# Patient Record
Sex: FEMALE | Race: BLACK OR AFRICAN AMERICAN | NOT HISPANIC OR LATINO | Employment: UNEMPLOYED | URBAN - METROPOLITAN AREA
[De-identification: names, ages, dates, MRNs, and addresses within clinical notes are randomized per-mention and may not be internally consistent; named-entity substitution may affect disease eponyms.]

---

## 2023-12-11 ENCOUNTER — HOSPITAL ENCOUNTER (OUTPATIENT)
Facility: HOSPITAL | Age: 88
Setting detail: OBSERVATION
Discharge: HOME | End: 2023-12-12
Attending: EMERGENCY MEDICINE
Payer: COMMERCIAL

## 2023-12-11 ENCOUNTER — APPOINTMENT (OUTPATIENT)
Dept: RADIOLOGY | Facility: HOSPITAL | Age: 88
End: 2023-12-11
Payer: COMMERCIAL
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
## 2023-12-11 DIAGNOSIS — G45.9 TIA (TRANSIENT ISCHEMIC ATTACK): ICD-10-CM

## 2023-12-11 DIAGNOSIS — R55 SYNCOPE AND COLLAPSE: ICD-10-CM

## 2023-12-11 LAB
ALBUMIN SERPL BCP-MCNC: 4.2 G/DL (ref 3.4–5)
ALP SERPL-CCNC: 116 U/L (ref 33–136)
ALT SERPL W P-5'-P-CCNC: 11 U/L (ref 7–45)
ANION GAP BLDV CALCULATED.4IONS-SCNC: 11 MMOL/L (ref 10–25)
ANION GAP SERPL CALC-SCNC: 19 MMOL/L (ref 10–20)
APTT PPP: 35 SECONDS (ref 27–38)
AST SERPL W P-5'-P-CCNC: 17 U/L (ref 9–39)
BASE EXCESS BLDV CALC-SCNC: 1.2 MMOL/L (ref -2–3)
BASOPHILS # BLD AUTO: 0.02 X10*3/UL (ref 0–0.1)
BASOPHILS NFR BLD AUTO: 0.2 %
BILIRUB SERPL-MCNC: 0.4 MG/DL (ref 0–1.2)
BODY TEMPERATURE: 37 DEGREES CELSIUS
BUN SERPL-MCNC: 23 MG/DL (ref 6–23)
CA-I BLDV-SCNC: 1.12 MMOL/L (ref 1.1–1.33)
CALCIUM SERPL-MCNC: 9.4 MG/DL (ref 8.6–10.6)
CARDIAC TROPONIN I PNL SERPL HS: 13 NG/L (ref 0–34)
CHLORIDE BLDV-SCNC: 104 MMOL/L (ref 98–107)
CHLORIDE SERPL-SCNC: 104 MMOL/L (ref 98–107)
CHOLEST SERPL-MCNC: 207 MG/DL (ref 0–199)
CHOLESTEROL/HDL RATIO: 4.2
CO2 SERPL-SCNC: 24 MMOL/L (ref 21–32)
CREAT SERPL-MCNC: 1.45 MG/DL (ref 0.5–1.05)
EOSINOPHIL # BLD AUTO: 0.03 X10*3/UL (ref 0–0.4)
EOSINOPHIL NFR BLD AUTO: 0.2 %
ERYTHROCYTE [DISTWIDTH] IN BLOOD BY AUTOMATED COUNT: 14.2 % (ref 11.5–14.5)
EST. AVERAGE GLUCOSE BLD GHB EST-MCNC: 103 MG/DL
GFR SERPL CREATININE-BSD FRML MDRD: 34 ML/MIN/1.73M*2
GLUCOSE BLD MANUAL STRIP-MCNC: 124 MG/DL (ref 74–99)
GLUCOSE BLDV-MCNC: 122 MG/DL (ref 74–99)
GLUCOSE SERPL-MCNC: 105 MG/DL (ref 74–99)
HBA1C MFR BLD: 5.2 %
HCO3 BLDV-SCNC: 26.6 MMOL/L (ref 22–26)
HCT VFR BLD AUTO: 45.3 % (ref 36–46)
HCT VFR BLD EST: 45 % (ref 36–46)
HDLC SERPL-MCNC: 48.9 MG/DL
HGB BLD-MCNC: 14.7 G/DL (ref 12–16)
HGB BLDV-MCNC: 15.1 G/DL (ref 12–16)
HOLD SPECIMEN: NORMAL
IMM GRANULOCYTES # BLD AUTO: 0.05 X10*3/UL (ref 0–0.5)
IMM GRANULOCYTES NFR BLD AUTO: 0.4 % (ref 0–0.9)
INR PPP: 1 (ref 0.9–1.1)
LACTATE BLDV-SCNC: 1.6 MMOL/L (ref 0.4–2)
LYMPHOCYTES # BLD AUTO: 1.22 X10*3/UL (ref 0.8–3)
LYMPHOCYTES NFR BLD AUTO: 9.7 %
MCH RBC QN AUTO: 27.3 PG (ref 26–34)
MCHC RBC AUTO-ENTMCNC: 32.5 G/DL (ref 32–36)
MCV RBC AUTO: 84 FL (ref 80–100)
MONOCYTES # BLD AUTO: 0.74 X10*3/UL (ref 0.05–0.8)
MONOCYTES NFR BLD AUTO: 5.9 %
NEUTROPHILS # BLD AUTO: 10.54 X10*3/UL (ref 1.6–5.5)
NEUTROPHILS NFR BLD AUTO: 83.6 %
NON-HDL CHOLESTEROL: 158 MG/DL (ref 0–149)
NRBC BLD-RTO: 0 /100 WBCS (ref 0–0)
OXYHGB MFR BLDV: 74.4 % (ref 45–75)
PCO2 BLDV: 44 MM HG (ref 41–51)
PH BLDV: 7.39 PH (ref 7.33–7.43)
PLATELET # BLD AUTO: 294 X10*3/UL (ref 150–450)
PO2 BLDV: 46 MM HG (ref 35–45)
POTASSIUM BLDV-SCNC: 5 MMOL/L (ref 3.5–5.3)
POTASSIUM SERPL-SCNC: 4.7 MMOL/L (ref 3.5–5.3)
PROT SERPL-MCNC: 6.9 G/DL (ref 6.4–8.2)
PROTHROMBIN TIME: 11.7 SECONDS (ref 9.8–12.8)
RBC # BLD AUTO: 5.39 X10*6/UL (ref 4–5.2)
SAO2 % BLDV: 76 % (ref 45–75)
SODIUM BLDV-SCNC: 137 MMOL/L (ref 136–145)
SODIUM SERPL-SCNC: 142 MMOL/L (ref 136–145)
WBC # BLD AUTO: 12.6 X10*3/UL (ref 4.4–11.3)

## 2023-12-11 PROCEDURE — 70450 CT HEAD/BRAIN W/O DYE: CPT

## 2023-12-11 PROCEDURE — 99291 CRITICAL CARE FIRST HOUR: CPT | Performed by: EMERGENCY MEDICINE

## 2023-12-11 PROCEDURE — 85025 COMPLETE CBC W/AUTO DIFF WBC: CPT | Performed by: STUDENT IN AN ORGANIZED HEALTH CARE EDUCATION/TRAINING PROGRAM

## 2023-12-11 PROCEDURE — 85730 THROMBOPLASTIN TIME PARTIAL: CPT | Performed by: STUDENT IN AN ORGANIZED HEALTH CARE EDUCATION/TRAINING PROGRAM

## 2023-12-11 PROCEDURE — 72125 CT NECK SPINE W/O DYE: CPT | Performed by: RADIOLOGY

## 2023-12-11 PROCEDURE — 82947 ASSAY GLUCOSE BLOOD QUANT: CPT | Mod: 59

## 2023-12-11 PROCEDURE — 83718 ASSAY OF LIPOPROTEIN: CPT

## 2023-12-11 PROCEDURE — G0378 HOSPITAL OBSERVATION PER HR: HCPCS

## 2023-12-11 PROCEDURE — 85610 PROTHROMBIN TIME: CPT | Performed by: STUDENT IN AN ORGANIZED HEALTH CARE EDUCATION/TRAINING PROGRAM

## 2023-12-11 PROCEDURE — 36415 COLL VENOUS BLD VENIPUNCTURE: CPT | Performed by: STUDENT IN AN ORGANIZED HEALTH CARE EDUCATION/TRAINING PROGRAM

## 2023-12-11 PROCEDURE — 71046 X-RAY EXAM CHEST 2 VIEWS: CPT | Performed by: RADIOLOGY

## 2023-12-11 PROCEDURE — 80053 COMPREHEN METABOLIC PANEL: CPT | Performed by: STUDENT IN AN ORGANIZED HEALTH CARE EDUCATION/TRAINING PROGRAM

## 2023-12-11 PROCEDURE — 70544 MR ANGIOGRAPHY HEAD W/O DYE: CPT | Mod: 59

## 2023-12-11 PROCEDURE — 70551 MRI BRAIN STEM W/O DYE: CPT

## 2023-12-11 PROCEDURE — 2500000004 HC RX 250 GENERAL PHARMACY W/ HCPCS (ALT 636 FOR OP/ED): Mod: SE | Performed by: STUDENT IN AN ORGANIZED HEALTH CARE EDUCATION/TRAINING PROGRAM

## 2023-12-11 PROCEDURE — 70551 MRI BRAIN STEM W/O DYE: CPT | Performed by: RADIOLOGY

## 2023-12-11 PROCEDURE — 84484 ASSAY OF TROPONIN QUANT: CPT | Performed by: STUDENT IN AN ORGANIZED HEALTH CARE EDUCATION/TRAINING PROGRAM

## 2023-12-11 PROCEDURE — 83036 HEMOGLOBIN GLYCOSYLATED A1C: CPT

## 2023-12-11 PROCEDURE — 96361 HYDRATE IV INFUSION ADD-ON: CPT

## 2023-12-11 PROCEDURE — 70450 CT HEAD/BRAIN W/O DYE: CPT | Performed by: RADIOLOGY

## 2023-12-11 PROCEDURE — 72125 CT NECK SPINE W/O DYE: CPT

## 2023-12-11 PROCEDURE — 84132 ASSAY OF SERUM POTASSIUM: CPT | Mod: 59

## 2023-12-11 PROCEDURE — 70544 MR ANGIOGRAPHY HEAD W/O DYE: CPT | Performed by: RADIOLOGY

## 2023-12-11 PROCEDURE — 82947 ASSAY GLUCOSE BLOOD QUANT: CPT

## 2023-12-11 PROCEDURE — 71046 X-RAY EXAM CHEST 2 VIEWS: CPT | Mod: FY

## 2023-12-11 PROCEDURE — 70547 MR ANGIOGRAPHY NECK W/O DYE: CPT

## 2023-12-11 PROCEDURE — 70547 MR ANGIOGRAPHY NECK W/O DYE: CPT | Performed by: RADIOLOGY

## 2023-12-11 PROCEDURE — 93010 ELECTROCARDIOGRAM REPORT: CPT | Performed by: EMERGENCY MEDICINE

## 2023-12-11 RX ORDER — ACETAMINOPHEN 650 MG/1
650 SUPPOSITORY RECTAL EVERY 4 HOURS PRN
Status: DISCONTINUED | OUTPATIENT
Start: 2023-12-11 | End: 2023-12-11

## 2023-12-11 RX ORDER — AMLODIPINE BESYLATE 10 MG/1
10 TABLET ORAL DAILY
Status: DISCONTINUED | OUTPATIENT
Start: 2023-12-11 | End: 2023-12-12 | Stop reason: HOSPADM

## 2023-12-11 RX ORDER — ACETAMINOPHEN 325 MG/1
650 TABLET ORAL EVERY 4 HOURS PRN
Status: DISCONTINUED | OUTPATIENT
Start: 2023-12-11 | End: 2023-12-12 | Stop reason: HOSPADM

## 2023-12-11 RX ORDER — ACETAMINOPHEN 160 MG/5ML
650 SOLUTION ORAL EVERY 4 HOURS PRN
Status: DISCONTINUED | OUTPATIENT
Start: 2023-12-11 | End: 2023-12-11

## 2023-12-11 RX ADMIN — SODIUM CHLORIDE, POTASSIUM CHLORIDE, SODIUM LACTATE AND CALCIUM CHLORIDE 1000 ML: 600; 310; 30; 20 INJECTION, SOLUTION INTRAVENOUS at 22:29

## 2023-12-11 ASSESSMENT — COLUMBIA-SUICIDE SEVERITY RATING SCALE - C-SSRS
1. IN THE PAST MONTH, HAVE YOU WISHED YOU WERE DEAD OR WISHED YOU COULD GO TO SLEEP AND NOT WAKE UP?: NO
2. HAVE YOU ACTUALLY HAD ANY THOUGHTS OF KILLING YOURSELF?: NO
6. HAVE YOU EVER DONE ANYTHING, STARTED TO DO ANYTHING, OR PREPARED TO DO ANYTHING TO END YOUR LIFE?: NO

## 2023-12-11 ASSESSMENT — PAIN SCALES - GENERAL: PAINLEVEL_OUTOF10: 0 - NO PAIN

## 2023-12-11 ASSESSMENT — PAIN - FUNCTIONAL ASSESSMENT: PAIN_FUNCTIONAL_ASSESSMENT: 0-10

## 2023-12-11 NOTE — SIGNIFICANT EVENT
CANCELED BAT  Nicolette Camarillo is a 89F with PMHx HTN, CKD who presents after a fall. BAT was called for slurred speech.    LKW 1100: patient was found after a fall at 1400, with abnormal jaw movement and vomiting. NIH 0; /94, glucose 122 on arrival. CTH demonstrated diffuse atrophy without acute intracranial process. Low concern for stroke; BAT canceled.    Of note, patient underwent CTH at 1743 but BAT was not paged out until 1800.    Elizabeth Pal  PGY-3 Neurology

## 2023-12-11 NOTE — ED PROVIDER NOTES
History/Exam limitations:  Aphasia .   Additional history was obtained from relative(s).    HPI:       Nicolette Camarillo is a 89 y.o. with a history of HTN, CKD presenting to the emergency department with concern for strokelike symptoms.  Per her daughter, last known well around 11 AM.  Patient reportedly had a fall around 2 PM, daughter unsure of the events surrounding the fall, history is limited due to patient aphasia.  The daughter states she came home and found the patient in bed, nauseous and vomiting with word finding difficulties in with reported the recent fall.  Daughter brought her immediately to the emergency department.  At this time, patient's aphasia, denies any pain.  No weakness noted.  Stroke alert activated from triage.     Last Known Well Time: 11 AM        ------------------------------------------------------------------------------------------------------------------------------------------     Physical Exam:    ED Triage Vitals [12/11/23 1736]   Temp Heart Rate Resp BP   37.5 °C (99.5 °F) 108 18 (!) 137/94      SpO2 Temp src Heart Rate Source Patient Position   94 % -- -- --      BP Location FiO2 (%)     -- --          Gen: Not in acute distress  Head/Neck: NCAT.  No cephalic hematomas, midface stable, no mandibular tenderness.  No midline C-spine tenderness palpation  Eyes: Anicteric sclerae, noninjected conjunctivae.  Pupils equal, round, and reactive to light bilaterally.  Extraocular movements intact.  Nose: No rhinorrhea  Mouth:  MMM  Heart: Regular rate and rhythm w/ no murmurs, rubs, gallops  Lungs: CTAB w/o wheezes, rales, rhonchi, no increased work of breathing.  No chest wall tenderness to palpation  Abdomen: Soft, NT/ND  Musculoskeletal: No deformities.  No tenderness in all 4 extremities to palpation.  No anterior L-spine tenderness to palpation.  Pelvis stable compression.  Extremities: No edema.  Neurologic: Please see below for NIHSS  Skin: No rashes noted  Psychological: Calm         Interval: Baseline  Time: 5:44 PM  Person Administering Scale: Xavier Torres MD     1a  Level of consciousness: 0=alert; keenly responsive   1b. LOC questions:  0=Performs both tasks correctly   1c. LOC commands: 0=Performs both tasks correctly   2.  Best Gaze: 0=normal   3. Visual: 0=No visual loss   4. Facial Palsy: 0=Normal symmetric movement   5a. Motor left arm: 0=No drift, limb holds 90 (or 45) degrees for full 10 seconds   5b.  Motor right arm: 0=No drift, limb holds 90 (or 45) degrees for full 10 seconds   6a. motor left le=No drift, limb holds 90 (or 45) degrees for full 10 seconds   6b  Motor right le=No drift, limb holds 90 (or 45) degrees for full 10 seconds   7. Limb Ataxia: 0=Absent   8.  Sensory: 0=Normal; no sensory loss   9. Best Language:  2   10. Dysarthria: 0=Normal   11. Extinction and Inattention: 0=No abnormality     Total:   2         VAN: VAN: Negative     ------------------------------------------------------------------------------------------------------------------------------------------     Medical Decision Making:     ED Course as of 12/11/23 2009   Mon Dec 11, 2023   1811 POCT GLUCOSE(!)  Normal blood sugar []   181 Blood Gas Venous Full Panel Unsolicited(!)  VBG with normal glucose, normal lactate, normal electrolytes and pH [SH]   1813 CT brain attack head wo IV contrast  CT head reviewed, independently interpreted without evidence of acute hemorrhage. [SH]   1813 CT cervical spine wo IV contrast  No acute fracture of the C-spine. [SH]   185 ECG 12 lead  EKG obtained at 1756 demonstrating normal sinus rhythm rate 94, normal axis, occasional PACs, normal intervals, no ST segment or T wave signs of ischemia. [SH]   1901 Protime-INR  INR normal [SH]   1903 CBC and Auto Differential(!)  Mild leukocytosis.  Normal Hemoglobin.  Normal platelets. [SH]   1918 Comprehensive metabolic panel(!)  Metabolic panel within normal limits with mild creatinine elevation, slightly  elevated from baseline [SH]   1918 Troponin I, High Sensitivity  Troponin normal [SH]   1931 XR chest 2 views  Chest x-ray negative [SH]      ED Course User Index  [SH] Xavier Torres MD         Diagnoses as of 12/11/23 2009   Fall, initial encounter   TIA (transient ischemic attack)        EKG interpreted by myself: As above in ED course     Independent Interpretation of Studies: I independently interpreted the CT head and see No obvious evidence of intracranial hemorrhage    Social Determinants of Health Significantly Affecting Care:  None     External Records Reviewed: I reviewed recent and relevant outside records including: None     Discussion of Management with Other Providers:   I discussed the patient/results with: Neurology and the CDU team      IV Thrombolysis IV Thrombolysis Checklist        IV Thrombolysis Given: No; Thrombolysis contraindication reason: Neurologic signs are mild and judged to be non-disabling, Neurologic signs have spontaneously resolved , and Time from Last Known Well (or stroke onset) is >4.5 hours         89-year-old female presenting to the emergency department with strokelike symptoms and fall at home.  On arrival vital signs within normal limits, notified by triage nurse of concern for strokelike symptoms.  Stroke alert activated from triage given patient's aphasia.  No other deficits.  Van negative.  Taken emergently to CT scanner where CT head was performed.  This demonstrated no acute hemorrhage.  Deferred CTA due to low suspicion for LVO.  Blood sugar normal.  Patient taken to treatment room where full NIH was performed revealing NIH score of 2.  Will plan for MRI given her aphasia.  Neurology evaluated the patient shortly after, their assessment reveals NIH of 0, resolution of the patient's symptoms.  Suspect possible TIA.  ABCD score is 5 with 1 point for age, 1 point for blood pressure, 1 point for symptoms, 2 points for symptom duration.  Will admit patient for  observation.  MRI is pending at this time.  Labs largely markable.  No signs of traumatic injury.  We did obtain CT C-spine in addition to the CT head given her age this was also negative.  Discussed patient with CDU provider for TIA workup patient admitted in stable condition.    Procedure  Procedures     Xavier Torres MD  Resident  12/12/23 1155    ---------------------------------  This patient was seen by the resident physician. I have seen and examined the patient, agree with the workup, evaluation, management and diagnosis. The care plan has been discussed and I concur.    My assessment reveals the following:    HPI:  Patient is a 89 y/o female presenting after a fall while getting out of bed. Witnessed by family. No head trauma. No LOC. Daughter at bedside last saw patient around 11am. No prodromal symptoms. No F/C/CP/SOB/N/V/abd pain. Activated as a BAT from triage. Patient requires my immediate attention.    Additional History Obtained from: Daughter at bedside.    PE:  Vital signs reviewed in nursing triage note, EMR flowsheets, and at patient's bedside  GEN: Patient is awake, alert, calm, cooperative, and in mild distress.  HEAD: Normocephalic and atraumatic.  EYES: Anicteric sclera. PERRL.   MOUTH: Mucous membranes moist.  CV: Regular rate and rhythm. (+) s1/s2. No murmurs/rubs/gallops.  PULM: CTAB. No wheezes, rales, or crackles.  GI: Soft, non-tender, non-distended without rebound or guarding.  EXT: No deformities noted. Full range of motion at all major joints. Non-tender.  NEURO: Moves all extremities. No gross focal deficits. Sensation grossly intact throughout.  SKIN: Warm, dry. No erythema or ecchymosis.    Labs Reviewed   CBC WITH AUTO DIFFERENTIAL - Abnormal       Result Value    WBC 12.6 (*)     nRBC 0.0      RBC 5.39 (*)     Hemoglobin 14.7      Hematocrit 45.3      MCV 84      MCH 27.3      MCHC 32.5      RDW 14.2      Platelets 294      Neutrophils % 83.6      Immature Granulocytes %,  Automated 0.4      Lymphocytes % 9.7      Monocytes % 5.9      Eosinophils % 0.2      Basophils % 0.2      Neutrophils Absolute 10.54 (*)     Immature Granulocytes Absolute, Automated 0.05      Lymphocytes Absolute 1.22      Monocytes Absolute 0.74      Eosinophils Absolute 0.03      Basophils Absolute 0.02     COMPREHENSIVE METABOLIC PANEL - Abnormal    Glucose 105 (*)     Sodium 142      Potassium 4.7      Chloride 104      Bicarbonate 24      Anion Gap 19      Urea Nitrogen 23      Creatinine 1.45 (*)     eGFR 34 (*)     Calcium 9.4      Albumin 4.2      Alkaline Phosphatase 116      Total Protein 6.9      AST 17      Bilirubin, Total 0.4      ALT 11     LIPID PANEL NON-FASTING - Abnormal    Cholesterol 207 (*)     HDL-Cholesterol 48.9      Cholesterol/HDL Ratio 4.2      Non-HDL Cholesterol 158 (*)    URINALYSIS WITH REFLEX MICROSCOPIC AND CULTURE - Abnormal    Color, Urine Yellow      Appearance, Urine Hazy (*)     Specific Gravity, Urine 1.016      pH, Urine 5.0      Protein, Urine >=500 (3+) (*)     Glucose, Urine NEGATIVE      Blood, Urine NEGATIVE      Ketones, Urine NEGATIVE      Bilirubin, Urine NEGATIVE      Urobilinogen, Urine <2.0      Nitrite, Urine NEGATIVE      Leukocyte Esterase, Urine SMALL (1+) (*)    MICROSCOPIC ONLY, URINE - Abnormal    WBC, Urine 11-20 (*)     RBC, Urine 3-5      Squamous Epithelial Cells, Urine 10-25 (FEW)     POCT GLUCOSE - Abnormal    POCT Glucose 124 (*)    BLOOD GAS VENOUS FULL PANEL UNSOLICITED - Abnormal    POCT pH, Venous 7.39      POCT pCO2, Venous 44      POCT pO2, Venous 46 (*)     POCT SO2, Venous 76 (*)     POCT Oxy Hemoglobin, Venous 74.4      POCT Hematocrit Calculated, Venous 45.0      POCT Sodium, Venous 137      POCT Potassium, Venous 5.0      POCT Chloride, Venous 104      POCT Ionized Calicum, Venous 1.12      POCT Glucose, Venous 122 (*)     POCT Lactate, Venous 1.6      POCT Base Excess, Venous 1.2      POCT HCO3 Calculated, Venous 26.6 (*)     POCT  Hemoglobin, Venous 15.1      POCT Anion Gap, Venous 11.0      Patient Temperature 37.0     TROPONIN I, HIGH SENSITIVITY - Normal    Troponin I, High Sensitivity 13      Narrative:     Less than 99th percentile of normal range cutoff-  Female and children under 18 years old <35 ng/L; Male <54 ng/L: Negative  Repeat testing should be performed if clinically indicated.     Female and children under 18 years old  ng/L; Male  ng/L:  Consistent with possible cardiac damage and possible increased clinical   risk. Serial measurements may help to assess extent of myocardial damage.     >120 ng/L: Consistent with cardiac damage, increased clinical risk and  myocardial infarction. Serial measurements may help assess extent of   myocardial damage.      NOTE: Children less than 1 year old may have higher baseline troponin   levels and results should be interpreted in conjunction with the overall   clinical context.    NOTE: Troponin I testing is performed using a different   testing methodology at [facility] than at other   [facility]. Direct result comparisons should only   be made within the same method.     PROTIME-INR - Normal    Protime 11.7      INR 1.0     APTT - Normal    aPTT 35      Narrative:     The APTT is no longer used for monitoring Unfractionated Heparin Therapy. For monitoring Heparin Therapy, use the Heparin Assay.   URINE CULTURE   GRAY TOP    Extra Tube Hold for add-ons.     URINALYSIS WITH REFLEX MICROSCOPIC AND CULTURE    Narrative:     The following orders were created for panel order Urinalysis with Reflex Microscopic and Culture.  Procedure                               Abnormality         Status                     ---------                               -----------         ------                     Urinalysis with Reflex M...[[id number]]  Abnormal            Final result               Extra Urine Gray Tube[[id number]]                            Final result                  Please view results for these tests on the individual orders.   HEMOGLOBIN A1C    Hemoglobin A1C 5.2      Estimated Average Glucose 103      Narrative:     Diagnosis of Diabetes-Adults  Non-Diabetic: < or = 5.6%  Increased risk for developing diabetes: 5.7-6.4%  Diagnostic of diabetes: > or = 6.5%    Monitoring of Diabetes  Age (y)....................... Therapeutic Goal (%)  Adults: >18.........................<7.0  Pediatrics: 13-18...................<7.5  Pediatrics: 7-12....................<8.0  Pediatrics: 0-6..................... 7.5-8.5    American Diabetes Association. Diabetes Care 33(S1), Jan 2010       EXTRA URINE GRAY TUBE    Extra Tube Hold for add-ons.     POCT GLUCOSE METER     Transthoracic Echo (TTE) Complete   Final Result      MR brain w and wo IV contrast   Final Result   1. Enhancing extra-axial right cerebellopontine angle mass extending   into a widened right internal auditory canal, most consistent with a   vestibular schwannoma. However, a cerebellopontine angle meningioma   remains on the differential.   2. No evidence of acute ischemia, intracranial hemorrhage, or midline   shift.   3. Unchanged chronic findings as above.        Signed by: Prieto Jackson 12/12/2023 7:23 AM   Dictation workstation:   [id number]      MR brain wo IV contrast   Final Result   MRI Brain:        1. No evidence of acute ischemia, intracranial hemorrhage, or midline   shift.Motion artifact limited examination.   2. 1.4 x 0.7 cm extra-axial mass at the right internal auditory   canal/cerebellopontine angle with slight mass effect on the adjacent   amy and abutting the 7th and 8th cranial nerves.. Appearance is   suggestive of meningioma. Follow-up brain MRI with contrast and   dedicated brainstem/cranial nerve sequences is recommended.   3. Extensive parenchymal white matter changes as above, which are   nonspecific but given patient's age likely reflect chronic small   vessel ischemic changes. Additional  T2/FLAIR hyperintensities in the   basal ganglia and thalami may reflect prominent perivascular spaces   and/or sequelae of remote lacunar infarcts.   4. Moderate parenchymal volume loss with slightly disproportionate   prominence of the lateral ventricles relative to cortical volume   loss. Borderline Tanner's index. Normal callosal angle.        MRA:   1. No evidence of major vessel cut off, intracranial aneurysm,   pseudoaneurysm, or dissection.   2.  Suboptimal flow related signal within the distal carotid   arteries, proximal middle cerebral arteries, and mid basilar artery   likely secondary to motion related artifact.   3. Moderate motion artifact degraded cervical MRA.   4. Multifocal mild-to-moderate narrowing of the bilateral   intracranial internal carotid arteries and vertebral arteries, likely   sequelae of atherosclerosis.        MACRO:   Critical Finding:  See findings. Notification was initiated on   12/11/2023 at 11:44 pm by  Ryan Leahy.  (**-YCF-**)   Instructions:        Signed by: Ryan Leahy 12/11/2023 11:46 PM   Dictation workstation:   [id number]      MR angio head wo IV contrast   Final Result   MRI Brain:        1. No evidence of acute ischemia, intracranial hemorrhage, or midline   shift.Motion artifact limited examination.   2. 1.4 x 0.7 cm extra-axial mass at the right internal auditory   canal/cerebellopontine angle with slight mass effect on the adjacent   amy and abutting the 7th and 8th cranial nerves.. Appearance is   suggestive of meningioma. Follow-up brain MRI with contrast and   dedicated brainstem/cranial nerve sequences is recommended.   3. Extensive parenchymal white matter changes as above, which are   nonspecific but given patient's age likely reflect chronic small   vessel ischemic changes. Additional T2/FLAIR hyperintensities in the   basal ganglia and thalami may reflect prominent perivascular spaces   and/or sequelae of remote lacunar infarcts.   4.  Moderate parenchymal volume loss with slightly disproportionate   prominence of the lateral ventricles relative to cortical volume   loss. Borderline Tanner's index. Normal callosal angle.        MRA:   1. No evidence of major vessel cut off, intracranial aneurysm,   pseudoaneurysm, or dissection.   2.  Suboptimal flow related signal within the distal carotid   arteries, proximal middle cerebral arteries, and mid basilar artery   likely secondary to motion related artifact.   3. Moderate motion artifact degraded cervical MRA.   4. Multifocal mild-to-moderate narrowing of the bilateral   intracranial internal carotid arteries and vertebral arteries, likely   sequelae of atherosclerosis.        MACRO:   Critical Finding:  See findings. Notification was initiated on   12/11/2023 at 11:44 pm by  Ryan Leahy.  (**-YCF-**)   Instructions:        Signed by: Ryan Leahy 12/11/2023 11:46 PM   Dictation workstation:   [id number]      MR angio neck wo IV contrast   Final Result   MRI Brain:        1. No evidence of acute ischemia, intracranial hemorrhage, or midline   shift.Motion artifact limited examination.   2. 1.4 x 0.7 cm extra-axial mass at the right internal auditory   canal/cerebellopontine angle with slight mass effect on the adjacent   amy and abutting the 7th and 8th cranial nerves.. Appearance is   suggestive of meningioma. Follow-up brain MRI with contrast and   dedicated brainstem/cranial nerve sequences is recommended.   3. Extensive parenchymal white matter changes as above, which are   nonspecific but given patient's age likely reflect chronic small   vessel ischemic changes. Additional T2/FLAIR hyperintensities in the   basal ganglia and thalami may reflect prominent perivascular spaces   and/or sequelae of remote lacunar infarcts.   4. Moderate parenchymal volume loss with slightly disproportionate   prominence of the lateral ventricles relative to cortical volume   loss. Borderline Tanner's  index. Normal callosal angle.        MRA:   1. No evidence of major vessel cut off, intracranial aneurysm,   pseudoaneurysm, or dissection.   2.  Suboptimal flow related signal within the distal carotid   arteries, proximal middle cerebral arteries, and mid basilar artery   likely secondary to motion related artifact.   3. Moderate motion artifact degraded cervical MRA.   4. Multifocal mild-to-moderate narrowing of the bilateral   intracranial internal carotid arteries and vertebral arteries, likely   sequelae of atherosclerosis.        MACRO:   Critical Finding:  See findings. Notification was initiated on   12/11/2023 at 11:44 pm by  Ryan Leahy.  (**-YCF-**)   Instructions:        Signed by: Ryan Leahy 12/11/2023 11:46 PM   Dictation workstation:   [id number]      XR chest 2 views   Final Result   1. Slight diffuse interstitial prominence, likely interstitial edema.   2. Stable cardiomegaly.        MACRO:   none.        Signed by: Ryan Leahy 12/11/2023 8:15 PM   Dictation workstation:   [id number]      CT brain attack head wo IV contrast   Final Result   The CT of the head demonstrates moderate brain parenchymal volume   loss.        The lateral ventricles demonstrate mild disproportionate prominence   when compared with the sulci within the cerebral convexities. No   obstructing mass lesions noted on this noncontrast CT study. This   mild disproportionate ventriculomegaly may be related to more   pronounced central volume loss versus a component of communicating   hydrocephalus.        There are patchy and confluent nonspecific white matter changes   within the cerebral hemispheres bilaterally as well as vague   hypodensity overlying the brainstem which while nonspecific, given   the patient's age, likely represent sequelae of small-vessel ischemic   change. Additional small scattered hypodensities are identified   within the subinsular regions, basal ganglia, and thalami  bilaterally   suggesting incidental mildly prominent perivascular spaces and/or   small scattered lacunar infarctions.             The CT of the cervical spine demonstrates no acute cervical spine   fracture.        There is 1 mm anterolisthesis of C3 on C4.        There is multilevel cervical spondylosis with the most pronounced   posterior osteophytic spurring noted at the C5/6 level. There are   degenerative uncovertebral joint changes and degenerative facet   changes contributing to bony encroachment upon the neural foramen   bilaterally within the cervical region.        I personally reviewed the images/study and I agree with the findings   as stated above by resident physician, Dr. Erick Ahuja. The   study was interpreted at [facility] in [facility].        MACRO:   None.   Neural foraminal stenosis.        Signed by: Bravo Roach 12/11/2023 6:06 PM   Dictation workstation:   [id number]      CT cervical spine wo IV contrast   Final Result   The CT of the head demonstrates moderate brain parenchymal volume   loss.        The lateral ventricles demonstrate mild disproportionate prominence   when compared with the sulci within the cerebral convexities. No   obstructing mass lesions noted on this noncontrast CT study. This   mild disproportionate ventriculomegaly may be related to more   pronounced central volume loss versus a component of communicating   hydrocephalus.        There are patchy and confluent nonspecific white matter changes   within the cerebral hemispheres bilaterally as well as vague   hypodensity overlying the brainstem which while nonspecific, given   the patient's age, likely represent sequelae of small-vessel ischemic   change. Additional small scattered hypodensities are identified   within the subinsular regions, basal ganglia, and thalami bilaterally   suggesting incidental mildly prominent perivascular spaces and/or   small scattered lacunar  infarctions.             The CT of the cervical spine demonstrates no acute cervical spine   fracture.        There is 1 mm anterolisthesis of C3 on C4.        There is multilevel cervical spondylosis with the most pronounced   posterior osteophytic spurring noted at the C5/6 level. There are   degenerative uncovertebral joint changes and degenerative facet   changes contributing to bony encroachment upon the neural foramen   bilaterally within the cervical region.        I personally reviewed the images/study and I agree with the findings   as stated above by resident physician, Dr. Erick Ahuja. The   study was interpreted at [facility] in [facility].        MACRO:   None.   Neural foraminal stenosis.        Signed by: Bravo Roach 12/11/2023 6:06 PM   Dictation workstation:   [id number]      Transthoracic Echo (TTE) Complete    (Results Pending)         Medical Decision Making:  - Emergent CT head without contrast and CT c-spine without contrast  - Monitor  - IV  - Labs  - CXR  - MRI brain and MRA head and neck  - Admitted to CDU for TIA work up.     EKG: EKG independently reviewed by the attending ED physician, and I and concur with the resident's/advanced practice provider's interpretation. Sinus rhythm at 94 bpm with PAC, normal axis, normal intervals, no ST or T wave changes.     Differential Diagnoses Considered: CVA, TIA, Infection, ACS    Independent Interpretation of Studies:  I independently interpreted: CXR showing cardiomegaly, but no definite infiltrates.     Escalation of Care:  Appropriate for inpatient management     MD Bert Oakes MD  12/12/23 3036

## 2023-12-11 NOTE — ED TRIAGE NOTES
PT had unwitnessed fall around [id number], unsure of how she felt or if she hit her head. Daughter states at pt had episode of vomiting, slurred speech and L R sided facial droop. PT A&Ox2 (time). Currently having expressive aphagia. LKW 1100

## 2023-12-12 ENCOUNTER — APPOINTMENT (OUTPATIENT)
Dept: CARDIOLOGY | Facility: HOSPITAL | Age: 88
End: 2023-12-12
Payer: COMMERCIAL

## 2023-12-12 ENCOUNTER — APPOINTMENT (OUTPATIENT)
Dept: RADIOLOGY | Facility: HOSPITAL | Age: 88
End: 2023-12-12
Payer: COMMERCIAL

## 2023-12-12 ENCOUNTER — ANCILLARY PROCEDURE (OUTPATIENT)
Dept: EMERGENCY MEDICINE | Facility: HOSPITAL | Age: 88
End: 2023-12-12
Payer: COMMERCIAL

## 2023-12-12 VITALS
SYSTOLIC BLOOD PRESSURE: 166 MMHG | DIASTOLIC BLOOD PRESSURE: 107 MMHG | WEIGHT: 145 LBS | RESPIRATION RATE: 16 BRPM | HEART RATE: 99 BPM | TEMPERATURE: 99.5 F | BODY MASS INDEX: 28.47 KG/M2 | HEIGHT: 60 IN | OXYGEN SATURATION: 92 %

## 2023-12-12 LAB
AORTIC VALVE MEAN GRADIENT: 11.4
AORTIC VALVE PEAK VELOCITY: 2.11
APPEARANCE UR: ABNORMAL
ATRIAL RATE: 94 BPM
AV PEAK GRADIENT: 17.8
AVA (PEAK VEL): 2.03
AVA (VTI): 2.57
BILIRUB UR STRIP.AUTO-MCNC: NEGATIVE MG/DL
COLOR UR: YELLOW
EJECTION FRACTION APICAL 4 CHAMBER: 69
EJECTION FRACTION: 70
GLUCOSE UR STRIP.AUTO-MCNC: NEGATIVE MG/DL
HOLD SPECIMEN: NORMAL
KETONES UR STRIP.AUTO-MCNC: NEGATIVE MG/DL
LEFT ATRIUM VOLUME AREA LENGTH INDEX BSA: 32
LEFT VENTRICULAR OUTFLOW TRACT DIAMETER: 1.99
LEUKOCYTE ESTERASE UR QL STRIP.AUTO: ABNORMAL
MITRAL VALVE E/A RATIO: 0.48
MITRAL VALVE E/E' RATIO: 11.58
NITRITE UR QL STRIP.AUTO: NEGATIVE
P AXIS: 63 DEGREES
P OFFSET: 209 MS
P ONSET: 155 MS
PH UR STRIP.AUTO: 5 [PH]
PR INTERVAL: 134 MS
PROT UR STRIP.AUTO-MCNC: ABNORMAL MG/DL
Q ONSET: 222 MS
QRS COUNT: 15 BEATS
QRS DURATION: 74 MS
QT INTERVAL: 356 MS
QTC CALCULATION(BAZETT): 445 MS
QTC FREDERICIA: 413 MS
R AXIS: 40 DEGREES
RBC # UR STRIP.AUTO: NEGATIVE /UL
RBC #/AREA URNS AUTO: ABNORMAL /HPF
RIGHT VENTRICLE FREE WALL PEAK S': 14.85
SP GR UR STRIP.AUTO: 1.02
SQUAMOUS #/AREA URNS AUTO: ABNORMAL /HPF
T AXIS: 56 DEGREES
T OFFSET: 400 MS
TRICUSPID ANNULAR PLANE SYSTOLIC EXCURSION: 1.8
UROBILINOGEN UR STRIP.AUTO-MCNC: <2 MG/DL
VENTRICULAR RATE: 94 BPM
WBC #/AREA URNS AUTO: ABNORMAL /HPF

## 2023-12-12 PROCEDURE — 2500000001 HC RX 250 WO HCPCS SELF ADMINISTERED DRUGS (ALT 637 FOR MEDICARE OP): Mod: SE

## 2023-12-12 PROCEDURE — 81001 URINALYSIS AUTO W/SCOPE: CPT | Performed by: EMERGENCY MEDICINE

## 2023-12-12 PROCEDURE — 70553 MRI BRAIN STEM W/O & W/DYE: CPT

## 2023-12-12 PROCEDURE — A9575 INJ GADOTERATE MEGLUMI 0.1ML: HCPCS | Mod: SE

## 2023-12-12 PROCEDURE — 96374 THER/PROPH/DIAG INJ IV PUSH: CPT | Mod: 59

## 2023-12-12 PROCEDURE — 81003 URINALYSIS AUTO W/O SCOPE: CPT | Performed by: EMERGENCY MEDICINE

## 2023-12-12 PROCEDURE — 70553 MRI BRAIN STEM W/O & W/DYE: CPT | Performed by: RADIOLOGY

## 2023-12-12 PROCEDURE — 93005 ELECTROCARDIOGRAM TRACING: CPT

## 2023-12-12 PROCEDURE — 93306 TTE W/DOPPLER COMPLETE: CPT

## 2023-12-12 PROCEDURE — G0378 HOSPITAL OBSERVATION PER HR: HCPCS

## 2023-12-12 PROCEDURE — 2500000004 HC RX 250 GENERAL PHARMACY W/ HCPCS (ALT 636 FOR OP/ED): Mod: SE | Performed by: PHYSICIAN ASSISTANT

## 2023-12-12 PROCEDURE — 2550000001 HC RX 255 CONTRASTS: Mod: SE

## 2023-12-12 PROCEDURE — 87086 URINE CULTURE/COLONY COUNT: CPT | Performed by: EMERGENCY MEDICINE

## 2023-12-12 PROCEDURE — 93306 TTE W/DOPPLER COMPLETE: CPT | Performed by: INTERNAL MEDICINE

## 2023-12-12 PROCEDURE — 2500000004 HC RX 250 GENERAL PHARMACY W/ HCPCS (ALT 636 FOR OP/ED): Mod: SE

## 2023-12-12 RX ORDER — AMLODIPINE BESYLATE 10 MG/1
10 TABLET ORAL DAILY
Qty: 30 TABLET | Refills: 0 | Status: SHIPPED | OUTPATIENT
Start: 2023-12-12 | End: 2024-01-11

## 2023-12-12 RX ORDER — GADOTERATE MEGLUMINE 376.9 MG/ML
13 INJECTION INTRAVENOUS
Status: COMPLETED | OUTPATIENT
Start: 2023-12-12 | End: 2023-12-12

## 2023-12-12 RX ORDER — HYDRALAZINE HYDROCHLORIDE 20 MG/ML
5 INJECTION INTRAMUSCULAR; INTRAVENOUS ONCE
Status: COMPLETED | OUTPATIENT
Start: 2023-12-12 | End: 2023-12-12

## 2023-12-12 RX ADMIN — HYDRALAZINE HYDROCHLORIDE 5 MG: 20 INJECTION INTRAMUSCULAR; INTRAVENOUS at 14:47

## 2023-12-12 RX ADMIN — AMLODIPINE BESYLATE 10 MG: 5 TABLET ORAL at 00:32

## 2023-12-12 RX ADMIN — ACETAMINOPHEN 650 MG: 325 TABLET ORAL at 00:33

## 2023-12-12 RX ADMIN — PERFLUTREN 2 ML OF DILUTION: 6.52 INJECTION, SUSPENSION INTRAVENOUS at 11:01

## 2023-12-12 RX ADMIN — GADOTERATE MEGLUMINE 13 ML: 376.9 INJECTION INTRAVENOUS at 04:12

## 2023-12-12 ASSESSMENT — PAIN SCALES - GENERAL: PAINLEVEL_OUTOF10: 0 - NO PAIN

## 2023-12-12 NOTE — ED PROVIDER NOTES
Disposition Note  Clinical Decision Unit   Patient: Nicolette Camarillo  MRN: [id number]  : 1932  Encounter Date: 2023  CDU Provider: Megha Wharton PA-C      Limitations to history: None  Independent Historian: Yes. Daughter clarifies  External Records Reviewed: Yes      Subjective:    Nicolette Camarillo is a 89 y.o. female has undergone comprehensive diagnostic evaluation and therapeutic management in accordance with the CDU guidelines for TIA. Based on Ms. Camarillo's clinical response and diagnostic information during this period of observation, it has been determined that the patient will be discharged.    The acute evaluation included:  Orders Placed This Encounter   Procedures    Critical Care    Urine Culture    CT brain attack head wo IV contrast    CT cervical spine wo IV contrast    MR brain wo IV contrast    MR angio head wo IV contrast    MR angio neck wo IV contrast    XR chest 2 views    MR brain w and wo IV contrast    CBC and Auto Differential    Comprehensive metabolic panel    Troponin I, High Sensitivity    Protime-INR    APTT    Extra Tubes    Gray Top    Urinalysis with Reflex Microscopic and Culture    Hemoglobin A1c    Lipid Panel Non-Fasting    Urinalysis with Reflex Microscopic and Culture    Extra Urine Gray Tube    Microscopic Only, Urine    Referral to Primary Care    Adult diet 2-3 grams sodium    Vital Signs    Neuro checks    Cardiac Monitoring - ED/ICU/PACU Only    Nursing swallow assessment    NIH Stroke Scale assessment    Notify provider (specify parameters)    Height and weight    NIHSS    Weight on admission    Height on admission    Activity (specify) No Restrictions    Vital Signs    Telemetry monitoring    Full code    Inpatient consult to Neurosurgery    Pulse oximetry, continuous    POCT GLUCOSE    ECG 12 lead    Electrocardiogram, 12-lead PRN ACS symptoms    Transthoracic Echo (TTE) Complete    Transthoracic Echo (TTE) Complete    Insert and maintain peripheral IV    Send  to CDU    Initiate observation status       Results for orders placed or performed during the hospital encounter of 12/11/23   CBC and Auto Differential   Result Value Ref Range    WBC 12.6 (H) 4.4 - 11.3 x10*3/uL    nRBC 0.0 0.0 - 0.0 /100 WBCs    RBC 5.39 (H) 4.00 - 5.20 x10*6/uL    Hemoglobin 14.7 12.0 - 16.0 g/dL    Hematocrit 45.3 36.0 - 46.0 %    MCV 84 80 - 100 fL    MCH 27.3 26.0 - 34.0 pg    MCHC 32.5 32.0 - 36.0 g/dL    RDW 14.2 11.5 - 14.5 %    Platelets 294 150 - 450 x10*3/uL    Neutrophils % 83.6 40.0 - 80.0 %    Immature Granulocytes %, Automated 0.4 0.0 - 0.9 %    Lymphocytes % 9.7 13.0 - 44.0 %    Monocytes % 5.9 2.0 - 10.0 %    Eosinophils % 0.2 0.0 - 6.0 %    Basophils % 0.2 0.0 - 2.0 %    Neutrophils Absolute 10.54 (H) 1.60 - 5.50 x10*3/uL    Immature Granulocytes Absolute, Automated 0.05 0.00 - 0.50 x10*3/uL    Lymphocytes Absolute 1.22 0.80 - 3.00 x10*3/uL    Monocytes Absolute 0.74 0.05 - 0.80 x10*3/uL    Eosinophils Absolute 0.03 0.00 - 0.40 x10*3/uL    Basophils Absolute 0.02 0.00 - 0.10 x10*3/uL   Comprehensive metabolic panel   Result Value Ref Range    Glucose 105 (H) 74 - 99 mg/dL    Sodium 142 136 - 145 mmol/L    Potassium 4.7 3.5 - 5.3 mmol/L    Chloride 104 98 - 107 mmol/L    Bicarbonate 24 21 - 32 mmol/L    Anion Gap 19 10 - 20 mmol/L    Urea Nitrogen 23 6 - 23 mg/dL    Creatinine 1.45 (H) 0.50 - 1.05 mg/dL    eGFR 34 (L) >60 mL/min/1.73m*2    Calcium 9.4 8.6 - 10.6 mg/dL    Albumin 4.2 3.4 - 5.0 g/dL    Alkaline Phosphatase 116 33 - 136 U/L    Total Protein 6.9 6.4 - 8.2 g/dL    AST 17 9 - 39 U/L    Bilirubin, Total 0.4 0.0 - 1.2 mg/dL    ALT 11 7 - 45 U/L   Troponin I, High Sensitivity   Result Value Ref Range    Troponin I, High Sensitivity 13 0 - 34 ng/L   Protime-INR   Result Value Ref Range    Protime 11.7 9.8 - 12.8 seconds    INR 1.0 0.9 - 1.1   APTT   Result Value Ref Range    aPTT 35 27 - 38 seconds   Gray Top   Result Value Ref Range    Extra Tube Hold for add-ons.     Hemoglobin A1c   Result Value Ref Range    Hemoglobin A1C 5.2 see below %    Estimated Average Glucose 103 Not Established mg/dL   Lipid Panel Non-Fasting   Result Value Ref Range    Cholesterol 207 (H) 0 - 199 mg/dL    HDL-Cholesterol 48.9 mg/dL    Cholesterol/HDL Ratio 4.2     Non-HDL Cholesterol 158 (H) 0 - 149 mg/dL   Urinalysis with Reflex Microscopic and Culture   Result Value Ref Range    Color, Urine Yellow Straw, Yellow    Appearance, Urine Hazy (N) Clear    Specific Gravity, Urine 1.016 1.005 - 1.035    pH, Urine 5.0 5.0, 5.5, 6.0, 6.5, 7.0, 7.5, 8.0    Protein, Urine >=500 (3+) (N) NEGATIVE mg/dL    Glucose, Urine NEGATIVE NEGATIVE mg/dL    Blood, Urine NEGATIVE NEGATIVE    Ketones, Urine NEGATIVE NEGATIVE mg/dL    Bilirubin, Urine NEGATIVE NEGATIVE    Urobilinogen, Urine <2.0 <2.0 mg/dL    Nitrite, Urine NEGATIVE NEGATIVE    Leukocyte Esterase, Urine SMALL (1+) (A) NEGATIVE   Extra Urine Gray Tube   Result Value Ref Range    Extra Tube Hold for add-ons.    Microscopic Only, Urine   Result Value Ref Range    WBC, Urine 11-20 (A) 1-5, NONE /HPF    RBC, Urine 3-5 NONE, 1-2, 3-5 /HPF    Squamous Epithelial Cells, Urine 10-25 (FEW) Reference range not established. /HPF   POCT GLUCOSE   Result Value Ref Range    POCT Glucose 124 (H) 74 - 99 mg/dL   ECG 12 lead   Result Value Ref Range    Ventricular Rate 94 BPM    Atrial Rate 94 BPM    ND Interval 134 ms    QRS Duration 74 ms    QT Interval 356 ms    QTC Calculation(Bazett) 445 ms    P Axis 63 degrees    R Axis 40 degrees    T Axis 56 degrees    QRS Count 15 beats    Q Onset 222 ms    P Onset 155 ms    P Offset 209 ms    T Offset 400 ms    QTC Fredericia 413 ms   Transthoracic Echo (TTE) Complete   Result Value Ref Range    AV pk timur 2.11     AV mn grad 11.4     LVOT diam 1.99     LV biplane EF 70     MV avg E/e' ratio 11.58     MV E/A ratio 0.48     LA vol index A/L 32.0     Tricuspid annular plane systolic excursion 1.8     RV free wall pk S' 14.85      Aortic Valve Area by Continuity of VTI 2.57     Aortic Valve Area by Continuity of Peak Velocity 2.03     AV pk grad 17.8     LV A4C EF 69.0    Blood Gas Venous Full Panel Unsolicited   Result Value Ref Range    POCT pH, Venous 7.39 7.33 - 7.43 pH    POCT pCO2, Venous 44 41 - 51 mm Hg    POCT pO2, Venous 46 (H) 35 - 45 mm Hg    POCT SO2, Venous 76 (H) 45 - 75 %    POCT Oxy Hemoglobin, Venous 74.4 45.0 - 75.0 %    POCT Hematocrit Calculated, Venous 45.0 36.0 - 46.0 %    POCT Sodium, Venous 137 136 - 145 mmol/L    POCT Potassium, Venous 5.0 3.5 - 5.3 mmol/L    POCT Chloride, Venous 104 98 - 107 mmol/L    POCT Ionized Calicum, Venous 1.12 1.10 - 1.33 mmol/L    POCT Glucose, Venous 122 (H) 74 - 99 mg/dL    POCT Lactate, Venous 1.6 0.4 - 2.0 mmol/L    POCT Base Excess, Venous 1.2 -2.0 - 3.0 mmol/L    POCT HCO3 Calculated, Venous 26.6 (H) 22.0 - 26.0 mmol/L    POCT Hemoglobin, Venous 15.1 12.0 - 16.0 g/dL    POCT Anion Gap, Venous 11.0 10.0 - 25.0 mmol/L    Patient Temperature 37.0 degrees Celsius            Past History     Past Medical History:   Diagnosis Date    COVID-19 01/28/2022    COVID-19     Past Surgical History:   Procedure Laterality Date    MR HEAD ANGIO WO IV CONTRAST  12/11/2023    MR HEAD ANGIO WO IV CONTRAST 12/11/2023 [facility] MRI    MR NECK ANGIO WO IV CONTRAST  12/11/2023    MR NECK ANGIO WO IV CONTRAST 12/11/2023 [facility] MRI     Social History     Socioeconomic History    Marital status:      Spouse name: None    Number of children: None    Years of education: None    Highest education level: None   Occupational History    None   Tobacco Use    Smoking status: Unknown    Smokeless tobacco: None   Substance and Sexual Activity    Alcohol use: None    Drug use: None    Sexual activity: None   Other Topics Concern    None   Social History Narrative    None     Social Determinants of Health     Financial Resource Strain: Not on file   Food Insecurity: Not on file   Transportation Needs: Not on file    Physical Activity: Not on file   Stress: Not on file   Social Connections: Not on file   Intimate Partner Violence: Not on file   Housing Stability: Not on file         Medications/Allergies     Previous Medications    No medications on file     Allergies   Allergen Reactions    Penicillins Hives    Sulfa (Sulfonamide Antibiotics) Hives         Review of Systems  All systems reviewed and otherwise negative, except as stated above in HPI.    Diagnostics reviewed by Megha Wharton PA-C     Labs:  Results for orders placed or performed during the hospital encounter of 12/11/23   CBC and Auto Differential   Result Value Ref Range    WBC 12.6 (H) 4.4 - 11.3 x10*3/uL    nRBC 0.0 0.0 - 0.0 /100 WBCs    RBC 5.39 (H) 4.00 - 5.20 x10*6/uL    Hemoglobin 14.7 12.0 - 16.0 g/dL    Hematocrit 45.3 36.0 - 46.0 %    MCV 84 80 - 100 fL    MCH 27.3 26.0 - 34.0 pg    MCHC 32.5 32.0 - 36.0 g/dL    RDW 14.2 11.5 - 14.5 %    Platelets 294 150 - 450 x10*3/uL    Neutrophils % 83.6 40.0 - 80.0 %    Immature Granulocytes %, Automated 0.4 0.0 - 0.9 %    Lymphocytes % 9.7 13.0 - 44.0 %    Monocytes % 5.9 2.0 - 10.0 %    Eosinophils % 0.2 0.0 - 6.0 %    Basophils % 0.2 0.0 - 2.0 %    Neutrophils Absolute 10.54 (H) 1.60 - 5.50 x10*3/uL    Immature Granulocytes Absolute, Automated 0.05 0.00 - 0.50 x10*3/uL    Lymphocytes Absolute 1.22 0.80 - 3.00 x10*3/uL    Monocytes Absolute 0.74 0.05 - 0.80 x10*3/uL    Eosinophils Absolute 0.03 0.00 - 0.40 x10*3/uL    Basophils Absolute 0.02 0.00 - 0.10 x10*3/uL   Comprehensive metabolic panel   Result Value Ref Range    Glucose 105 (H) 74 - 99 mg/dL    Sodium 142 136 - 145 mmol/L    Potassium 4.7 3.5 - 5.3 mmol/L    Chloride 104 98 - 107 mmol/L    Bicarbonate 24 21 - 32 mmol/L    Anion Gap 19 10 - 20 mmol/L    Urea Nitrogen 23 6 - 23 mg/dL    Creatinine 1.45 (H) 0.50 - 1.05 mg/dL    eGFR 34 (L) >60 mL/min/1.73m*2    Calcium 9.4 8.6 - 10.6 mg/dL    Albumin 4.2 3.4 - 5.0 g/dL    Alkaline Phosphatase 116 33 -  136 U/L    Total Protein 6.9 6.4 - 8.2 g/dL    AST 17 9 - 39 U/L    Bilirubin, Total 0.4 0.0 - 1.2 mg/dL    ALT 11 7 - 45 U/L   Troponin I, High Sensitivity   Result Value Ref Range    Troponin I, High Sensitivity 13 0 - 34 ng/L   Protime-INR   Result Value Ref Range    Protime 11.7 9.8 - 12.8 seconds    INR 1.0 0.9 - 1.1   APTT   Result Value Ref Range    aPTT 35 27 - 38 seconds   Gray Top   Result Value Ref Range    Extra Tube Hold for add-ons.    Hemoglobin A1c   Result Value Ref Range    Hemoglobin A1C 5.2 see below %    Estimated Average Glucose 103 Not Established mg/dL   Lipid Panel Non-Fasting   Result Value Ref Range    Cholesterol 207 (H) 0 - 199 mg/dL    HDL-Cholesterol 48.9 mg/dL    Cholesterol/HDL Ratio 4.2     Non-HDL Cholesterol 158 (H) 0 - 149 mg/dL   Urinalysis with Reflex Microscopic and Culture   Result Value Ref Range    Color, Urine Yellow Straw, Yellow    Appearance, Urine Hazy (N) Clear    Specific Gravity, Urine 1.016 1.005 - 1.035    pH, Urine 5.0 5.0, 5.5, 6.0, 6.5, 7.0, 7.5, 8.0    Protein, Urine >=500 (3+) (N) NEGATIVE mg/dL    Glucose, Urine NEGATIVE NEGATIVE mg/dL    Blood, Urine NEGATIVE NEGATIVE    Ketones, Urine NEGATIVE NEGATIVE mg/dL    Bilirubin, Urine NEGATIVE NEGATIVE    Urobilinogen, Urine <2.0 <2.0 mg/dL    Nitrite, Urine NEGATIVE NEGATIVE    Leukocyte Esterase, Urine SMALL (1+) (A) NEGATIVE   Extra Urine Gray Tube   Result Value Ref Range    Extra Tube Hold for add-ons.    Microscopic Only, Urine   Result Value Ref Range    WBC, Urine 11-20 (A) 1-5, NONE /HPF    RBC, Urine 3-5 NONE, 1-2, 3-5 /HPF    Squamous Epithelial Cells, Urine 10-25 (FEW) Reference range not established. /HPF   POCT GLUCOSE   Result Value Ref Range    POCT Glucose 124 (H) 74 - 99 mg/dL   ECG 12 lead   Result Value Ref Range    Ventricular Rate 94 BPM    Atrial Rate 94 BPM    OR Interval 134 ms    QRS Duration 74 ms    QT Interval 356 ms    QTC Calculation(Bazett) 445 ms    P Axis 63 degrees    R Axis 40  degrees    T Axis 56 degrees    QRS Count 15 beats    Q Onset 222 ms    P Onset 155 ms    P Offset 209 ms    T Offset 400 ms    QTC Fredericia 413 ms   Transthoracic Echo (TTE) Complete   Result Value Ref Range    AV pk timur 2.11     AV mn grad 11.4     LVOT diam 1.99     LV biplane EF 70     MV avg E/e' ratio 11.58     MV E/A ratio 0.48     LA vol index A/L 32.0     Tricuspid annular plane systolic excursion 1.8     RV free wall pk S' 14.85     Aortic Valve Area by Continuity of VTI 2.57     Aortic Valve Area by Continuity of Peak Velocity 2.03     AV pk grad 17.8     LV A4C EF 69.0    Blood Gas Venous Full Panel Unsolicited   Result Value Ref Range    POCT pH, Venous 7.39 7.33 - 7.43 pH    POCT pCO2, Venous 44 41 - 51 mm Hg    POCT pO2, Venous 46 (H) 35 - 45 mm Hg    POCT SO2, Venous 76 (H) 45 - 75 %    POCT Oxy Hemoglobin, Venous 74.4 45.0 - 75.0 %    POCT Hematocrit Calculated, Venous 45.0 36.0 - 46.0 %    POCT Sodium, Venous 137 136 - 145 mmol/L    POCT Potassium, Venous 5.0 3.5 - 5.3 mmol/L    POCT Chloride, Venous 104 98 - 107 mmol/L    POCT Ionized Calicum, Venous 1.12 1.10 - 1.33 mmol/L    POCT Glucose, Venous 122 (H) 74 - 99 mg/dL    POCT Lactate, Venous 1.6 0.4 - 2.0 mmol/L    POCT Base Excess, Venous 1.2 -2.0 - 3.0 mmol/L    POCT HCO3 Calculated, Venous 26.6 (H) 22.0 - 26.0 mmol/L    POCT Hemoglobin, Venous 15.1 12.0 - 16.0 g/dL    POCT Anion Gap, Venous 11.0 10.0 - 25.0 mmol/L    Patient Temperature 37.0 degrees Celsius     Radiographs:  Transthoracic Echo (TTE) Complete   Final Result      MR brain w and wo IV contrast   Final Result   1. Enhancing extra-axial right cerebellopontine angle mass extending   into a widened right internal auditory canal, most consistent with a   vestibular schwannoma. However, a cerebellopontine angle meningioma   remains on the differential.   2. No evidence of acute ischemia, intracranial hemorrhage, or midline   shift.   3. Unchanged chronic findings as above.         Signed by: Prieto Jackson 12/12/2023 7:23 AM   Dictation workstation:   [id number]      MR brain wo IV contrast   Final Result   MRI Brain:        1. No evidence of acute ischemia, intracranial hemorrhage, or midline   shift.Motion artifact limited examination.   2. 1.4 x 0.7 cm extra-axial mass at the right internal auditory   canal/cerebellopontine angle with slight mass effect on the adjacent   amy and abutting the 7th and 8th cranial nerves.. Appearance is   suggestive of meningioma. Follow-up brain MRI with contrast and   dedicated brainstem/cranial nerve sequences is recommended.   3. Extensive parenchymal white matter changes as above, which are   nonspecific but given patient's age likely reflect chronic small   vessel ischemic changes. Additional T2/FLAIR hyperintensities in the   basal ganglia and thalami may reflect prominent perivascular spaces   and/or sequelae of remote lacunar infarcts.   4. Moderate parenchymal volume loss with slightly disproportionate   prominence of the lateral ventricles relative to cortical volume   loss. Borderline Tanner's index. Normal callosal angle.        MRA:   1. No evidence of major vessel cut off, intracranial aneurysm,   pseudoaneurysm, or dissection.   2.  Suboptimal flow related signal within the distal carotid   arteries, proximal middle cerebral arteries, and mid basilar artery   likely secondary to motion related artifact.   3. Moderate motion artifact degraded cervical MRA.   4. Multifocal mild-to-moderate narrowing of the bilateral   intracranial internal carotid arteries and vertebral arteries, likely   sequelae of atherosclerosis.        MACRO:   Critical Finding:  See findings. Notification was initiated on   12/11/2023 at 11:44 pm by  Ryan Leahy.  (**-YCF-**)   Instructions:        Signed by: Ryan Leahy 12/11/2023 11:46 PM   Dictation workstation:   [id number]      MR angio head wo IV contrast   Final Result   MRI Brain:        1. No  evidence of acute ischemia, intracranial hemorrhage, or midline   shift.Motion artifact limited examination.   2. 1.4 x 0.7 cm extra-axial mass at the right internal auditory   canal/cerebellopontine angle with slight mass effect on the adjacent   amy and abutting the 7th and 8th cranial nerves.. Appearance is   suggestive of meningioma. Follow-up brain MRI with contrast and   dedicated brainstem/cranial nerve sequences is recommended.   3. Extensive parenchymal white matter changes as above, which are   nonspecific but given patient's age likely reflect chronic small   vessel ischemic changes. Additional T2/FLAIR hyperintensities in the   basal ganglia and thalami may reflect prominent perivascular spaces   and/or sequelae of remote lacunar infarcts.   4. Moderate parenchymal volume loss with slightly disproportionate   prominence of the lateral ventricles relative to cortical volume   loss. Borderline Tanner's index. Normal callosal angle.        MRA:   1. No evidence of major vessel cut off, intracranial aneurysm,   pseudoaneurysm, or dissection.   2.  Suboptimal flow related signal within the distal carotid   arteries, proximal middle cerebral arteries, and mid basilar artery   likely secondary to motion related artifact.   3. Moderate motion artifact degraded cervical MRA.   4. Multifocal mild-to-moderate narrowing of the bilateral   intracranial internal carotid arteries and vertebral arteries, likely   sequelae of atherosclerosis.        MACRO:   Critical Finding:  See findings. Notification was initiated on   12/11/2023 at 11:44 pm by  Ryan Leahy.  (**-YCF-**)   Instructions:        Signed by: Ryan Leahy 12/11/2023 11:46 PM   Dictation workstation:   [id number]      MR angio neck wo IV contrast   Final Result   MRI Brain:        1. No evidence of acute ischemia, intracranial hemorrhage, or midline   shift.Motion artifact limited examination.   2. 1.4 x 0.7 cm extra-axial mass at the right  internal auditory   canal/cerebellopontine angle with slight mass effect on the adjacent   amy and abutting the 7th and 8th cranial nerves.. Appearance is   suggestive of meningioma. Follow-up brain MRI with contrast and   dedicated brainstem/cranial nerve sequences is recommended.   3. Extensive parenchymal white matter changes as above, which are   nonspecific but given patient's age likely reflect chronic small   vessel ischemic changes. Additional T2/FLAIR hyperintensities in the   basal ganglia and thalami may reflect prominent perivascular spaces   and/or sequelae of remote lacunar infarcts.   4. Moderate parenchymal volume loss with slightly disproportionate   prominence of the lateral ventricles relative to cortical volume   loss. Borderline Tanner's index. Normal callosal angle.        MRA:   1. No evidence of major vessel cut off, intracranial aneurysm,   pseudoaneurysm, or dissection.   2.  Suboptimal flow related signal within the distal carotid   arteries, proximal middle cerebral arteries, and mid basilar artery   likely secondary to motion related artifact.   3. Moderate motion artifact degraded cervical MRA.   4. Multifocal mild-to-moderate narrowing of the bilateral   intracranial internal carotid arteries and vertebral arteries, likely   sequelae of atherosclerosis.        MACRO:   Critical Finding:  See findings. Notification was initiated on   12/11/2023 at 11:44 pm by  Ryan Leahy.  (**-YCF-**)   Instructions:        Signed by: Ryan Leahy 12/11/2023 11:46 PM   Dictation workstation:   [id number]      XR chest 2 views   Final Result   1. Slight diffuse interstitial prominence, likely interstitial edema.   2. Stable cardiomegaly.        MACRO:   none.        Signed by: Ryan Leahy 12/11/2023 8:15 PM   Dictation workstation:   [id number]      CT brain attack head wo IV contrast   Final Result   The CT of the head demonstrates moderate brain parenchymal volume   loss.         The lateral ventricles demonstrate mild disproportionate prominence   when compared with the sulci within the cerebral convexities. No   obstructing mass lesions noted on this noncontrast CT study. This   mild disproportionate ventriculomegaly may be related to more   pronounced central volume loss versus a component of communicating   hydrocephalus.        There are patchy and confluent nonspecific white matter changes   within the cerebral hemispheres bilaterally as well as vague   hypodensity overlying the brainstem which while nonspecific, given   the patient's age, likely represent sequelae of small-vessel ischemic   change. Additional small scattered hypodensities are identified   within the subinsular regions, basal ganglia, and thalami bilaterally   suggesting incidental mildly prominent perivascular spaces and/or   small scattered lacunar infarctions.             The CT of the cervical spine demonstrates no acute cervical spine   fracture.        There is 1 mm anterolisthesis of C3 on C4.        There is multilevel cervical spondylosis with the most pronounced   posterior osteophytic spurring noted at the C5/6 level. There are   degenerative uncovertebral joint changes and degenerative facet   changes contributing to bony encroachment upon the neural foramen   bilaterally within the cervical region.        I personally reviewed the images/study and I agree with the findings   as stated above by resident physician, Dr. Erick Ahuja. The   study was interpreted at [facility] in [facility].        MACRO:   None.   Neural foraminal stenosis.        Signed by: Bravo Roach 12/11/2023 6:06 PM   Dictation workstation:   [id number]      CT cervical spine wo IV contrast   Final Result   The CT of the head demonstrates moderate brain parenchymal volume   loss.        The lateral ventricles demonstrate mild disproportionate prominence   when compared with the sulci  within the cerebral convexities. No   obstructing mass lesions noted on this noncontrast CT study. This   mild disproportionate ventriculomegaly may be related to more   pronounced central volume loss versus a component of communicating   hydrocephalus.        There are patchy and confluent nonspecific white matter changes   within the cerebral hemispheres bilaterally as well as vague   hypodensity overlying the brainstem which while nonspecific, given   the patient's age, likely represent sequelae of small-vessel ischemic   change. Additional small scattered hypodensities are identified   within the subinsular regions, basal ganglia, and thalami bilaterally   suggesting incidental mildly prominent perivascular spaces and/or   small scattered lacunar infarctions.             The CT of the cervical spine demonstrates no acute cervical spine   fracture.        There is 1 mm anterolisthesis of C3 on C4.        There is multilevel cervical spondylosis with the most pronounced   posterior osteophytic spurring noted at the C5/6 level. There are   degenerative uncovertebral joint changes and degenerative facet   changes contributing to bony encroachment upon the neural foramen   bilaterally within the cervical region.        I personally reviewed the images/study and I agree with the findings   as stated above by resident physician, Dr. Erick Ahuja. The   study was interpreted at [facility] in [facility].        MACRO:   None.   Neural foraminal stenosis.        Signed by: Bravo Roach 12/11/2023 6:06 PM   Dictation workstation:   [id number]      Transthoracic Echo (TTE) Complete    (Results Pending)           Physical Exam     Visit Vitals  BP (!) 166/107 (BP Location: Right arm, Patient Position: Lying)   Pulse 99   Temp 37.5 °C (99.5 °F)   Resp 16   Ht 1.524 m (5')   Wt 65.8 kg (145 lb)   SpO2 92%   BMI 28.32 kg/m²   Smoking Status Unknown   BSA 1.67 m²       GENERAL:  The  patient appears nourished and normally developed. Vital signs as documented.     HEENT:  Head normocephalic, atraumatic, EOMs intact, PERRLA, Mucous membranes moist. Nares patent without copious rhinorrhea.  No lymphadenopathy.    PULMONARY:  Lungs are clear to auscultation, without any respiratory distress. Able to speak full sentences, no accessory muscle use    CARDIAC:   Normal rate. No murmurs, rubs or gallops    ABDOMEN:  Soft, non-distended, non-tender, BS positive x 4 quadrants, No rebound or guarding, no peritoneal signs, no CVA tenderness, no masses or organomegaly    MUSCULOSKELETAL:   Able to ambulate, Non edematous, with no obvious deformities. Pulses intact distal    SKIN:   Good color, with no significant rashes.  No pallor.    NEURO:  No obvious neurological deficits, normal sensation and strength bilaterally.  Able to follow commands.    Psych: Appropriate mood and affect    Consultants  1) Neurosurgery      Impression and Plan    In summary, Nicolette Camarillo has been cared for according to the standard [facility] Clinical Decision Unit observation protocol for TIA (transient ischemic attack). This extended period of observation was specifically required to determine the need for hospitalization. Prior to discharge from observation, the final physical exam is documented above. I have reviewed the results of the labs and imaging that were performed in the ED as well as the CDU.      Significant events during the course of observation based on the goals of the clinical problem list include:   1) Remained stable    Based on the patient's condition and test results, the patient will be discharged.    Date and Time of Disposition.   Discharge: 12/12/2023, 17:30 PM    Dr. Bernardo is the CDU disposition attending.      Discharge Diagnosis  TIA (transient ischemic attack)    Issues Requiring Follow-Up  Hypertension    Discharge Meds     Your medication list        START taking  these medications        Instructions Last Dose Given Next Dose Due   amLODIPine 10 mg tablet  Commonly known as: Norvasc      Take 1 tablet (10 mg) by mouth once daily.                 Where to Get Your Medications        You can get these medications from any pharmacy    Bring a paper prescription for each of these medications  amLODIPine 10 mg tablet         Test Results Pending At Discharge  Pending Labs       Order Current Status    Urine Culture In process            Hospital Course   Ms. Camarillo was admitted to the clinical decision unit for TIA work up/rule out. Labs obtained were within normal limits outside of kidney function. Urinalysis displayed small leukocytes. Culture still pending. ECG revealed normal sinus rhythm. She recevied a LR bolus yesterday evening.  Blood pressure remained extremely elevated throughout patients stay although she did receive both Amlodipine and Hydralazine.  CXR displayed slight interstitial edema.  Fall/TIA workup included CT brain, CT cervical spine, and MR brain with and without contrast which were largely unremarkable for any acute ischemia, intracranial hemorrhage, or midline shift. 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 MR angio head and neck also showed no evidence of major vessel cut off, intracranial aneurysm, pseudoaneurysm, or dissection. Neurosurgery consulted to see patient and review findings. They concluded that the MRI finding of the CPA mass was likely incidental in nature. They will arrange follow-up imaging in 12 months for stability.   TTE also obtained as part of TIA workup. Cardiology was contacted to clarify these findings. Spoke with  who explained that Ms. Camarillo's LV is hyperdynamic when squeezing which is likely secondary to dehydration. He said there appears to be no obstruction to the outflow  "track or hypertrophy, therefore there is nothing to do acutely.   Patient is hemodynamically stable outside of high blood pressure readings and has no symptoms such as CP, SOB, dizziness, palpitations, HA, facial droop, aphasia, or N/V.  Case was discussed with Dr. Bangura due to blood pressure concerns. He stated that this is asymptomatic hypertension and the patient is able to be discharged.   Patient will be discharged with a prescription for 10 mg Amlodipine daily. Order placed for Geriatric PCP. Patient is to follow up with this provider within 1-2 weeks to further manage blood pressure concerns. Patient was educated on \"red flag\" symptoms and when to return to the ED, if necessary. Also instructed on appropriate diet for blood pressure control.   Patient and daughter are understanding and agreeable to plan and all questions were answered to satisfaction.    Outpatient Follow-Up  No future appointments.      Megha Wharton PA-C   Emergency Medicine/Clinical Decision Unit   [facility]      Megha Wharton PA-C  12/12/23 1825       Chetan Ontiveros PA-C  12/21/23 0820    "

## 2023-12-12 NOTE — ED PROCEDURE NOTE
Procedure  Critical Care    Performed by: Bert Chávez MD  Authorized by: Bert Chávez MD    Critical care provider statement:     Critical care time (minutes):  30    Critical care time was exclusive of:  Separately billable procedures and treating other patients    Critical care was necessary to treat or prevent imminent or life-threatening deterioration of the following conditions:  CNS failure or compromise    Critical care was time spent personally by me on the following activities:  Ordering and performing treatments and interventions, ordering and review of laboratory studies, development of treatment plan with patient or surrogate, discussions with consultants, ordering and review of radiographic studies, pulse oximetry, evaluation of patient's response to treatment, re-evaluation of patient's condition, examination of patient, obtaining history from patient or surrogate and interpretation of cardiac output measurements    I assumed direction of critical care for this patient from another provider in my specialty: no      Care discussed with: admitting provider                 Bert Chávez MD  12/12/23 6546

## 2023-12-12 NOTE — H&P
Date of Placement in CDU: 12/11/2023    Patient History  Mrs. Camarillo is a 89-year-old female with a past medical history of hypertension and CKD who was admitted to the CDU for TIA workup.  Patient initially presented after an unwitnessed fall.  According to her relative, she had episodes of emesis and facial droop.  When seen in the emergency room, she did have expressive aphasia.  Her NIH was initially 2 and she was activated as a BAT stroke alert.  CT imaging was unremarkable for ischemic event.  Given patient's symptoms, the primary team felt that patient exhibited signs of a TIA.  I did have a discussion with the supervising attending and provider and the decision was made to admit patient to the CDU for MRIs of the brain, MRA of the head and neck, and a TIA workup.  Patient was agreeable to this plan.  On my reevaluation, patient has no new complaints.  Patient's NIH is currently 0.  My neurological exam is unremarkable.  I will continue to monitor.  Patient remained stable throughout the remainder course of this provider's care.      Update: MRI imaging was significant for a mass with a small amount of mass effect.  As such, neurosurgery was consulted.  They did recommend a repeat MRI but with contrast.  Patient was handed off with this new MRI still pending.    Past Medical History: See above HPI  Past Surgical History: Reviewed, per EMR  Social History: Denies drug, alcohol, and tobacco use  Family History: Reviewed and unrelated to patient's presentation      Medications  Reviewed and reconciled        Diagnostic Evaluation  Diagnostic studies and ED interventions germane to this period of clinical observation will include:   MRI brain, MRA head and neck  Blood pressure control      Consultants (if applicable)  1) neurosurgery if needed      Physical Exam  Constitutional:       General: She is awake. She is not in acute distress.     Appearance: Normal appearance. She is well-developed. She is not  toxic-appearing.   HENT:      Head: Normocephalic and atraumatic.   Eyes:      Extraocular Movements: Extraocular movements intact.      Pupils: Pupils are equal, round, and reactive to light.   Cardiovascular:      Rate and Rhythm: Normal rate and regular rhythm.      Heart sounds: Normal heart sounds. No murmur heard.     No friction rub. No gallop.   Pulmonary:      Effort: Pulmonary effort is normal.      Breath sounds: Normal breath sounds. No wheezing or rhonchi.   Abdominal:      General: There is no distension.      Tenderness: There is no abdominal tenderness. There is no guarding or rebound.   Musculoskeletal:         General: Normal range of motion.   Neurological:      Mental Status: She is alert and oriented to person, place, and time.      Cranial Nerves: No cranial nerve deficit.      Sensory: Sensation is intact.      Motor: Motor function is intact.      Comments: Cranial nerves II through XII intact bilaterally.  No cerebellar ataxia.  No nystagmus on exam.   Psychiatric:         Mood and Affect: Mood and affect normal.        POCT GLUCOSE        Component Value Flag Ref Range Units Status    POCT Glucose 124      74 - 99 mg/dL Final                  CBC and Auto Differential        Component Value Flag Ref Range Units Status    WBC 12.6      4.4 - 11.3 x10*3/uL Final    nRBC 0.0      0.0 - 0.0 /100 WBCs Final    RBC 5.39      4.00 - 5.20 x10*6/uL Final    Hemoglobin 14.7      12.0 - 16.0 g/dL Final    Hematocrit 45.3      36.0 - 46.0 % Final    MCV 84      80 - 100 fL Final    MCH 27.3      26.0 - 34.0 pg Final    MCHC 32.5      32.0 - 36.0 g/dL Final    RDW 14.2      11.5 - 14.5 % Final    Platelets 294      150 - 450 x10*3/uL Final    Neutrophils % 83.6      40.0 - 80.0 % Final    Immature Granulocytes %, Automated 0.4      0.0 - 0.9 % Final    Comment:    Immature Granulocyte Count (IG) includes promyelocytes, myelocytes and metamyelocytes but does not include bands. Percent differential counts  (%) should be interpreted in the context of the absolute cell counts (cells/UL).    Lymphocytes % 9.7      13.0 - 44.0 % Final    Monocytes % 5.9      2.0 - 10.0 % Final    Eosinophils % 0.2      0.0 - 6.0 % Final    Basophils % 0.2      0.0 - 2.0 % Final    Neutrophils Absolute 10.54      1.60 - 5.50 x10*3/uL Final    Comment:    Percent differential counts (%) should be interpreted in the context of the absolute cell counts (cells/uL).    Immature Granulocytes Absolute, Automated 0.05      0.00 - 0.50 x10*3/uL Final    Lymphocytes Absolute 1.22      0.80 - 3.00 x10*3/uL Final    Monocytes Absolute 0.74      0.05 - 0.80 x10*3/uL Final    Eosinophils Absolute 0.03      0.00 - 0.40 x10*3/uL Final    Basophils Absolute 0.02      0.00 - 0.10 x10*3/uL Final                  Comprehensive metabolic panel        Component Value Flag Ref Range Units Status    Glucose 105      74 - 99 mg/dL Final    Sodium 142      136 - 145 mmol/L Final    Potassium 4.7      3.5 - 5.3 mmol/L Final    Chloride 104      98 - 107 mmol/L Final    Bicarbonate 24      21 - 32 mmol/L Final    Anion Gap 19      10 - 20 mmol/L Final    Urea Nitrogen 23      6 - 23 mg/dL Final    Creatinine 1.45      0.50 - 1.05 mg/dL Final    eGFR 34      >60 mL/min/1.73m*2 Final    Comment:    Calculations of estimated GFR are performed using the 2021 CKD-EPI Study Refit equation without the race variable for the IDMS-Traceable creatinine methods.  https://jasn.asnjournals.org/content/early/2021/09/22/ASN.2663107122    Calcium 9.4      8.6 - 10.6 mg/dL Final    Albumin 4.2      3.4 - 5.0 g/dL Final    Alkaline Phosphatase 116      33 - 136 U/L Final    Total Protein 6.9      6.4 - 8.2 g/dL Final    AST 17      9 - 39 U/L Final    Bilirubin, Total 0.4      0.0 - 1.2 mg/dL Final    ALT 11      7 - 45 U/L Final    Comment:    Patients treated with Sulfasalazine may generate falsely decreased results for ALT.                  Troponin I, High Sensitivity         Component Value Flag Ref Range Units Status    Troponin I, High Sensitivity 13      0 - 34 ng/L Final                  Protime-INR        Component Value Flag Ref Range Units Status    Protime 11.7      9.8 - 12.8 seconds Final    INR 1.0      0.9 - 1.1  Final                  APTT        Component Value Flag Ref Range Units Status    aPTT 35      27 - 38 seconds Final                  CT brain attack head wo IV contrast          Interpreted By:  Bravo Roach and Liller Gregory   STUDY:  CT BRAIN ATTACK HEAD WO IV CONTRAST; CT CERVICAL SPINE WO IV  CONTRAST;  12/11/2023 5:49 pm      INDICATION:  Signs/Symptoms:Stroke Evaluation; Signs/Symptoms:fall.      COMPARISON:  None.      ACCESSION NUMBER(S):  [id number]; [id number]      ORDERING CLINICIAN:  MEMO MCALLISTER      TECHNIQUE:  Axial CT images of the head were obtained without intravenous  contrast administration.      In addition, thin cut axial CT images through the cervical spine were  obtained and reconstructed in the coronal and sagittal planes.      FINDINGS:  The CT of the head demonstrates moderate brain parenchymal volume  loss.      The lateral ventricles demonstrate mild disproportionate prominence  when compared with the sulci within the cerebral convexities. No  obstructing mass lesions noted on this noncontrast CT study. This  mild disproportionate ventriculomegaly may be related to more  pronounced central volume loss versus a component of communicating  hydrocephalus.      There are patchy and confluent nonspecific white matter changes  within the cerebral hemispheres bilaterally as well as vague  hypodensity overlying the brainstem which while nonspecific, given  the patient's age, likely represent sequelae of small-vessel ischemic  change. Additional small scattered hypodensities are identified  within the subinsular regions, basal ganglia, and thalami bilaterally  suggesting incidental mildly prominent perivascular spaces and/or  small  scattered lacunar infarctions. No hyperdense acute intracranial  hemorrhage is noted. There is no midline shift.      Atherosclerotic calcifications are noted along the carotid siphons,  distal vertebral artery and basilar artery.      The paranasal sinuses and mastoid air cells are clear.      There is a component of hyperostosis frontalis interna.      The CT of the cervical spine demonstrates no acute cervical spine  fracture.      There is 1 mm anterolisthesis of C3 on C4.      There is multilevel cervical spondylosis with the most pronounced  posterior osteophytic spurring noted at the C5/6 level. There are  degenerative uncovertebral joint changes and degenerative facet  changes contributing to bony encroachment upon the neural foramen  bilaterally within the cervical region.      IMPRESSION:  The CT of the head demonstrates moderate brain parenchymal volume  loss.      The lateral ventricles demonstrate mild disproportionate prominence  when compared with the sulci within the cerebral convexities. No  obstructing mass lesions noted on this noncontrast CT study. This  mild disproportionate ventriculomegaly may be related to more  pronounced central volume loss versus a component of communicating  hydrocephalus.      There are patchy and confluent nonspecific white matter changes  within the cerebral hemispheres bilaterally as well as vague  hypodensity overlying the brainstem which while nonspecific, given  the patient's age, likely represent sequelae of small-vessel ischemic  change. Additional small scattered hypodensities are identified  within the subinsular regions, basal ganglia, and thalami bilaterally  suggesting incidental mildly prominent perivascular spaces and/or  small scattered lacunar infarctions.          The CT of the cervical spine demonstrates no acute cervical spine  fracture.      There is 1 mm anterolisthesis of C3 on C4.      There is multilevel cervical spondylosis with the most  pronounced  posterior osteophytic spurring noted at the C5/6 level. There are  degenerative uncovertebral joint changes and degenerative facet  changes contributing to bony encroachment upon the neural foramen  bilaterally within the cervical region.      I personally reviewed the images/study and I agree with the findings  as stated above by resident physician, Dr. Erick Ahuja. The  study was interpreted at [facility] in [facility].      MACRO:  None.  Neural foraminal stenosis.      Signed by: Bravo Roach 12/11/2023 6:06 PM  Dictation workstation:   [id number]         CT cervical spine wo IV contrast          Interpreted By:  Bravo Roach and Liller Gregory   STUDY:  CT BRAIN ATTACK HEAD WO IV CONTRAST; CT CERVICAL SPINE WO IV  CONTRAST;  12/11/2023 5:49 pm      INDICATION:  Signs/Symptoms:Stroke Evaluation; Signs/Symptoms:fall.      COMPARISON:  None.      ACCESSION NUMBER(S):  [id number]; [id number]      ORDERING CLINICIAN:  MEMO MCALLISTER      TECHNIQUE:  Axial CT images of the head were obtained without intravenous  contrast administration.      In addition, thin cut axial CT images through the cervical spine were  obtained and reconstructed in the coronal and sagittal planes.      FINDINGS:  The CT of the head demonstrates moderate brain parenchymal volume  loss.      The lateral ventricles demonstrate mild disproportionate prominence  when compared with the sulci within the cerebral convexities. No  obstructing mass lesions noted on this noncontrast CT study. This  mild disproportionate ventriculomegaly may be related to more  pronounced central volume loss versus a component of communicating  hydrocephalus.      There are patchy and confluent nonspecific white matter changes  within the cerebral hemispheres bilaterally as well as vague  hypodensity overlying the brainstem which while nonspecific, given  the patient's age, likely represent sequelae of  small-vessel ischemic  change. Additional small scattered hypodensities are identified  within the subinsular regions, basal ganglia, and thalami bilaterally  suggesting incidental mildly prominent perivascular spaces and/or  small scattered lacunar infarctions. No hyperdense acute intracranial  hemorrhage is noted. There is no midline shift.      Atherosclerotic calcifications are noted along the carotid siphons,  distal vertebral artery and basilar artery.      The paranasal sinuses and mastoid air cells are clear.      There is a component of hyperostosis frontalis interna.      The CT of the cervical spine demonstrates no acute cervical spine  fracture.      There is 1 mm anterolisthesis of C3 on C4.      There is multilevel cervical spondylosis with the most pronounced  posterior osteophytic spurring noted at the C5/6 level. There are  degenerative uncovertebral joint changes and degenerative facet  changes contributing to bony encroachment upon the neural foramen  bilaterally within the cervical region.      IMPRESSION:  The CT of the head demonstrates moderate brain parenchymal volume  loss.      The lateral ventricles demonstrate mild disproportionate prominence  when compared with the sulci within the cerebral convexities. No  obstructing mass lesions noted on this noncontrast CT study. This  mild disproportionate ventriculomegaly may be related to more  pronounced central volume loss versus a component of communicating  hydrocephalus.      There are patchy and confluent nonspecific white matter changes  within the cerebral hemispheres bilaterally as well as vague  hypodensity overlying the brainstem which while nonspecific, given  the patient's age, likely represent sequelae of small-vessel ischemic  change. Additional small scattered hypodensities are identified  within the subinsular regions, basal ganglia, and thalami bilaterally  suggesting incidental mildly prominent perivascular spaces and/or  small  scattered lacunar infarctions.          The CT of the cervical spine demonstrates no acute cervical spine  fracture.      There is 1 mm anterolisthesis of C3 on C4.      There is multilevel cervical spondylosis with the most pronounced  posterior osteophytic spurring noted at the C5/6 level. There are  degenerative uncovertebral joint changes and degenerative facet  changes contributing to bony encroachment upon the neural foramen  bilaterally within the cervical region.      I personally reviewed the images/study and I agree with the findings  as stated above by resident physician, Dr. Erick Ahuja. The  study was interpreted at [facility] in [facility].      MACRO:  None.  Neural foraminal stenosis.      Signed by: Bravo Roach 12/11/2023 6:06 PM  Dictation workstation:   [id number]         Blood Gas Venous Full Panel Unsolicited        Component Value Flag Ref Range Units Status    POCT pH, Venous 7.39      7.33 - 7.43 pH Final    POCT pCO2, Venous 44      41 - 51 mm Hg Final    POCT pO2, Venous 46      35 - 45 mm Hg Final    POCT SO2, Venous 76      45 - 75 % Final    POCT Oxy Hemoglobin, Venous 74.4      45.0 - 75.0 % Final    POCT Hematocrit Calculated, Venous 45.0      36.0 - 46.0 % Final    POCT Sodium, Venous 137      136 - 145 mmol/L Final    POCT Potassium, Venous 5.0      3.5 - 5.3 mmol/L Final    POCT Chloride, Venous 104      98 - 107 mmol/L Final    POCT Ionized Calicum, Venous 1.12      1.10 - 1.33 mmol/L Final    POCT Glucose, Venous 122      74 - 99 mg/dL Final    POCT Lactate, Venous 1.6      0.4 - 2.0 mmol/L Final    POCT Base Excess, Venous 1.2      -2.0 - 3.0 mmol/L Final    POCT HCO3 Calculated, Venous 26.6      22.0 - 26.0 mmol/L Final    POCT Hemoglobin, Venous 15.1      12.0 - 16.0 g/dL Final    POCT Anion Gap, Venous 11.0      10.0 - 25.0 mmol/L Final    Patient Temperature 37.0       degrees Celsius Final    Comment:    NOTE: Patient Results  are Not Corrected for Temperature                  MR brain wo IV contrast          Interpreted By:  Ryan Leahy and Sullivan Shannon   STUDY:  MR BRAIN WO IV CONTRAST; MR ANGIO NECK WO IV CONTRAST; MR ANGIO HEAD  WO IV CONTRAST;  12/11/2023 8:12 pm      INDICATION:  Signs/Symptoms:Aphasia, c/f stroke.  Stroke protocol.      COMPARISON:  Noncontrast CT head 12/11/2023      ACCESSION NUMBER(S):  [id number]; [id number]; [id number]      ORDERING CLINICIAN:  MEMO MCALLISTER      TECHNIQUE:  Axial T2, FLAIR, DWI, gradient echo T2 and  sagittal and coronal T1  weighted images of brain were acquired.      Time-of-flight MRA of the head  and neck was performed. The images  were reviewed as source images and maximum intensity projections.      FINDINGS:  MRI brain:      Suboptimal evaluation due to patient motion, particularly on the  coronal and sagittal T1 sequences..      No DWI or ADC signal abnormality to suggest acute infarct. Extensive  bilateral confluent T2/FLAIR hyperintensities are seen in the  throughout bilateral subcortical and periventricular white matter as  well as the amy , which are nonspecific but favored to present  sequelae of chronic small vessel ischemic changes. Small T2  hyperintensities in the bilateral basal ganglia and thalami may  represent prominent perivascular spaces and or sequelae of remote  lacunar infarcts.      There is a 1.4 x 0.7 cm extra-axial mass extending from the right  internal auditory canal superiorly and contributing to mild mass  effect on the right ventral amy. The mass extends into the right IAC  orifice and appears to contribute to mass effect on the 7th and 8th  cranial nerves. (Axial image 12/40).      Gradient T2 echo does not demonstrate evidence of hemorrhage. No  intracranial mass, mass effect, or midline shift. No extra-axial  fluid collection.      There is diffuse prominence of the ventricles, sulci, and basal  cisterns, with mild disproportionate  prominence of the lateral  ventricles. Santana index measures approximately 0.3 and the callosal  angle is approximately 112 degrees.      Bilateral lens replacements are noted. Mild right sphenoid sinus  mucosal thickening.          MRA of head:      The source images are mildly degraded by artifact. Suboptimal flow  related signal within the distal carotid arteries, proximal middle  cerebral arteries (distal M1 and proximal M2), and mid basilar artery  likely secondary to motion related artifact.      Anterior circulation:  There is mild multifocal narrowing of the  bilateral intracranial internal carotid arteries, corresponding to  atherosclerotic disease seen on same day CT.  The bilateral  intracranial internal carotid arteries, bilateral carotid terminals,  bilateral proximal anterior and middle cerebral arteries are patent.      Posterior circulation:  There is multifocal narrowing of the  bilateral intracranial vertebral arteries, moderate on the right and  mild on the left, corresponding to atherosclerotic disease on same  day CT. There is tortuosity of the right intracranial vertebral  artery.  Bilateral intracranial vertebral arteries, vertebrobasilar  junction, basilar artery and proximal posterior cerebral arteries are  patent.      No evidence of intracranial aneurysm or large vessel cut off.          MRA of neck:      The source images are moderately degraded by motion artifact.      Right carotid vessels:  The common carotid artery is patent. There is  mild attenuation of flow signal at the carotid bifurcation which may  be secondary to flow related artifact. The internal carotid artery in  the neck is patent. There is less than 50% stenosis at the right ICA  origin per NASCET criteria. There is tortuosity of the right internal  carotid artery with a retropharyngeal course.      Left carotid vessels:   The common carotid artery is patent. There is  mild attenuation of flow signal at the carotid  bifurcation which may  be secondary to flow related artifact. The internal carotid artery in  the neck is patent.There is less than 50% stenosis per NASCET  criteria. There is a retropharyngeal course of the left internal  carotid artery.      Vertebral vessels:   The visualized segments of the cervical  vertebral arteries are patent. There is a left dominant vertebral  artery circulation. There is marked tortuosity of the bilateral  cervical vertebral arteries.      No evidence of large vessel cut off, pseudoaneurysm, or dissection.      IMPRESSION:  MRI Brain:      1. No evidence of acute ischemia, intracranial hemorrhage, or midline  shift.Motion artifact limited examination.  2. 1.4 x 0.7 cm extra-axial mass at the right internal auditory  canal/cerebellopontine angle with slight mass effect on the adjacent  amy and abutting the 7th and 8th cranial nerves.. Appearance is  suggestive of meningioma. Follow-up brain MRI with contrast and  dedicated brainstem/cranial nerve sequences is recommended.  3. Extensive parenchymal white matter changes as above, which are  nonspecific but given patient's age likely reflect chronic small  vessel ischemic changes. Additional T2/FLAIR hyperintensities in the  basal ganglia and thalami may reflect prominent perivascular spaces  and/or sequelae of remote lacunar infarcts.  4. Moderate parenchymal volume loss with slightly disproportionate  prominence of the lateral ventricles relative to cortical volume  loss. Borderline Tanner's index. Normal callosal angle.      MRA:  1. No evidence of major vessel cut off, intracranial aneurysm,  pseudoaneurysm, or dissection.  2.  Suboptimal flow related signal within the distal carotid  arteries, proximal middle cerebral arteries, and mid basilar artery  likely secondary to motion related artifact.  3. Moderate motion artifact degraded cervical MRA.  4. Multifocal mild-to-moderate narrowing of the bilateral  intracranial internal carotid  arteries and vertebral arteries, likely  sequelae of atherosclerosis.      MACRO:  Critical Finding:  See findings. Notification was initiated on  12/11/2023 at 11:44 pm by  Ryan Leahy.  (**-YCF-**)  Instructions:      Signed by: Ryan Leahy 12/11/2023 11:46 PM  Dictation workstation:   [id number]         MR angio head wo IV contrast          Interpreted By:  Ryan Leahy and Sullivan Shannon   STUDY:  MR BRAIN WO IV CONTRAST; MR ANGIO NECK WO IV CONTRAST; MR ANGIO HEAD  WO IV CONTRAST;  12/11/2023 8:12 pm      INDICATION:  Signs/Symptoms:Aphasia, c/f stroke.  Stroke protocol.      COMPARISON:  Noncontrast CT head 12/11/2023      ACCESSION NUMBER(S):  [id number]; [id number]; [id number]      ORDERING CLINICIAN:  MEMO MCALLISTER      TECHNIQUE:  Axial T2, FLAIR, DWI, gradient echo T2 and  sagittal and coronal T1  weighted images of brain were acquired.      Time-of-flight MRA of the head  and neck was performed. The images  were reviewed as source images and maximum intensity projections.      FINDINGS:  MRI brain:      Suboptimal evaluation due to patient motion, particularly on the  coronal and sagittal T1 sequences..      No DWI or ADC signal abnormality to suggest acute infarct. Extensive  bilateral confluent T2/FLAIR hyperintensities are seen in the  throughout bilateral subcortical and periventricular white matter as  well as the amy , which are nonspecific but favored to present  sequelae of chronic small vessel ischemic changes. Small T2  hyperintensities in the bilateral basal ganglia and thalami may  represent prominent perivascular spaces and or sequelae of remote  lacunar infarcts.      There is a 1.4 x 0.7 cm extra-axial mass extending from the right  internal auditory canal superiorly and contributing to mild mass  effect on the right ventral amy. The mass extends into the right IAC  orifice and appears to contribute to mass effect on the 7th and 8th  cranial nerves.  (Axial image 12/40).      Gradient T2 echo does not demonstrate evidence of hemorrhage. No  intracranial mass, mass effect, or midline shift. No extra-axial  fluid collection.      There is diffuse prominence of the ventricles, sulci, and basal  cisterns, with mild disproportionate prominence of the lateral  ventricles. Santana index measures approximately 0.3 and the callosal  angle is approximately 112 degrees.      Bilateral lens replacements are noted. Mild right sphenoid sinus  mucosal thickening.          MRA of head:      The source images are mildly degraded by artifact. Suboptimal flow  related signal within the distal carotid arteries, proximal middle  cerebral arteries (distal M1 and proximal M2), and mid basilar artery  likely secondary to motion related artifact.      Anterior circulation:  There is mild multifocal narrowing of the  bilateral intracranial internal carotid arteries, corresponding to  atherosclerotic disease seen on same day CT.  The bilateral  intracranial internal carotid arteries, bilateral carotid terminals,  bilateral proximal anterior and middle cerebral arteries are patent.      Posterior circulation:  There is multifocal narrowing of the  bilateral intracranial vertebral arteries, moderate on the right and  mild on the left, corresponding to atherosclerotic disease on same  day CT. There is tortuosity of the right intracranial vertebral  artery.  Bilateral intracranial vertebral arteries, vertebrobasilar  junction, basilar artery and proximal posterior cerebral arteries are  patent.      No evidence of intracranial aneurysm or large vessel cut off.          MRA of neck:      The source images are moderately degraded by motion artifact.      Right carotid vessels:  The common carotid artery is patent. There is  mild attenuation of flow signal at the carotid bifurcation which may  be secondary to flow related artifact. The internal carotid artery in  the neck is patent. There is less  than 50% stenosis at the right ICA  origin per NASCET criteria. There is tortuosity of the right internal  carotid artery with a retropharyngeal course.      Left carotid vessels:   The common carotid artery is patent. There is  mild attenuation of flow signal at the carotid bifurcation which may  be secondary to flow related artifact. The internal carotid artery in  the neck is patent.There is less than 50% stenosis per NASCET  criteria. There is a retropharyngeal course of the left internal  carotid artery.      Vertebral vessels:   The visualized segments of the cervical  vertebral arteries are patent. There is a left dominant vertebral  artery circulation. There is marked tortuosity of the bilateral  cervical vertebral arteries.      No evidence of large vessel cut off, pseudoaneurysm, or dissection.      IMPRESSION:  MRI Brain:      1. No evidence of acute ischemia, intracranial hemorrhage, or midline  shift.Motion artifact limited examination.  2. 1.4 x 0.7 cm extra-axial mass at the right internal auditory  canal/cerebellopontine angle with slight mass effect on the adjacent  amy and abutting the 7th and 8th cranial nerves.. Appearance is  suggestive of meningioma. Follow-up brain MRI with contrast and  dedicated brainstem/cranial nerve sequences is recommended.  3. Extensive parenchymal white matter changes as above, which are  nonspecific but given patient's age likely reflect chronic small  vessel ischemic changes. Additional T2/FLAIR hyperintensities in the  basal ganglia and thalami may reflect prominent perivascular spaces  and/or sequelae of remote lacunar infarcts.  4. Moderate parenchymal volume loss with slightly disproportionate  prominence of the lateral ventricles relative to cortical volume  loss. Borderline Tanner's index. Normal callosal angle.      MRA:  1. No evidence of major vessel cut off, intracranial aneurysm,  pseudoaneurysm, or dissection.  2.  Suboptimal flow related signal within the  distal carotid  arteries, proximal middle cerebral arteries, and mid basilar artery  likely secondary to motion related artifact.  3. Moderate motion artifact degraded cervical MRA.  4. Multifocal mild-to-moderate narrowing of the bilateral  intracranial internal carotid arteries and vertebral arteries, likely  sequelae of atherosclerosis.      MACRO:  Critical Finding:  See findings. Notification was initiated on  12/11/2023 at 11:44 pm by  Ryan Leahy.  (**-YCF-**)  Instructions:      Signed by: Ryan Leahy 12/11/2023 11:46 PM  Dictation workstation:   [id number]         MR angio neck wo IV contrast          Interpreted By:  Ryan Leahy and Sullivan Shannon   STUDY:  MR BRAIN WO IV CONTRAST; MR ANGIO NECK WO IV CONTRAST; MR ANGIO HEAD  WO IV CONTRAST;  12/11/2023 8:12 pm      INDICATION:  Signs/Symptoms:Aphasia, c/f stroke.  Stroke protocol.      COMPARISON:  Noncontrast CT head 12/11/2023      ACCESSION NUMBER(S):  [id number]; [id number]; [id number]      ORDERING CLINICIAN:  MEMO MCALLISTER      TECHNIQUE:  Axial T2, FLAIR, DWI, gradient echo T2 and  sagittal and coronal T1  weighted images of brain were acquired.      Time-of-flight MRA of the head  and neck was performed. The images  were reviewed as source images and maximum intensity projections.      FINDINGS:  MRI brain:      Suboptimal evaluation due to patient motion, particularly on the  coronal and sagittal T1 sequences..      No DWI or ADC signal abnormality to suggest acute infarct. Extensive  bilateral confluent T2/FLAIR hyperintensities are seen in the  throughout bilateral subcortical and periventricular white matter as  well as the amy , which are nonspecific but favored to present  sequelae of chronic small vessel ischemic changes. Small T2  hyperintensities in the bilateral basal ganglia and thalami may  represent prominent perivascular spaces and or sequelae of remote  lacunar infarcts.      There is a 1.4 x 0.7  cm extra-axial mass extending from the right  internal auditory canal superiorly and contributing to mild mass  effect on the right ventral amy. The mass extends into the right IAC  orifice and appears to contribute to mass effect on the 7th and 8th  cranial nerves. (Axial image 12/40).      Gradient T2 echo does not demonstrate evidence of hemorrhage. No  intracranial mass, mass effect, or midline shift. No extra-axial  fluid collection.      There is diffuse prominence of the ventricles, sulci, and basal  cisterns, with mild disproportionate prominence of the lateral  ventricles. Santana index measures approximately 0.3 and the callosal  angle is approximately 112 degrees.      Bilateral lens replacements are noted. Mild right sphenoid sinus  mucosal thickening.          MRA of head:      The source images are mildly degraded by artifact. Suboptimal flow  related signal within the distal carotid arteries, proximal middle  cerebral arteries (distal M1 and proximal M2), and mid basilar artery  likely secondary to motion related artifact.      Anterior circulation:  There is mild multifocal narrowing of the  bilateral intracranial internal carotid arteries, corresponding to  atherosclerotic disease seen on same day CT.  The bilateral  intracranial internal carotid arteries, bilateral carotid terminals,  bilateral proximal anterior and middle cerebral arteries are patent.      Posterior circulation:  There is multifocal narrowing of the  bilateral intracranial vertebral arteries, moderate on the right and  mild on the left, corresponding to atherosclerotic disease on same  day CT. There is tortuosity of the right intracranial vertebral  artery.  Bilateral intracranial vertebral arteries, vertebrobasilar  junction, basilar artery and proximal posterior cerebral arteries are  patent.      No evidence of intracranial aneurysm or large vessel cut off.          MRA of neck:      The source images are moderately degraded by  motion artifact.      Right carotid vessels:  The common carotid artery is patent. There is  mild attenuation of flow signal at the carotid bifurcation which may  be secondary to flow related artifact. The internal carotid artery in  the neck is patent. There is less than 50% stenosis at the right ICA  origin per NASCET criteria. There is tortuosity of the right internal  carotid artery with a retropharyngeal course.      Left carotid vessels:   The common carotid artery is patent. There is  mild attenuation of flow signal at the carotid bifurcation which may  be secondary to flow related artifact. The internal carotid artery in  the neck is patent.There is less than 50% stenosis per NASCET  criteria. There is a retropharyngeal course of the left internal  carotid artery.      Vertebral vessels:   The visualized segments of the cervical  vertebral arteries are patent. There is a left dominant vertebral  artery circulation. There is marked tortuosity of the bilateral  cervical vertebral arteries.      No evidence of large vessel cut off, pseudoaneurysm, or dissection.      IMPRESSION:  MRI Brain:      1. No evidence of acute ischemia, intracranial hemorrhage, or midline  shift.Motion artifact limited examination.  2. 1.4 x 0.7 cm extra-axial mass at the right internal auditory  canal/cerebellopontine angle with slight mass effect on the adjacent  amy and abutting the 7th and 8th cranial nerves.. Appearance is  suggestive of meningioma. Follow-up brain MRI with contrast and  dedicated brainstem/cranial nerve sequences is recommended.  3. Extensive parenchymal white matter changes as above, which are  nonspecific but given patient's age likely reflect chronic small  vessel ischemic changes. Additional T2/FLAIR hyperintensities in the  basal ganglia and thalami may reflect prominent perivascular spaces  and/or sequelae of remote lacunar infarcts.  4. Moderate parenchymal volume loss with slightly  disproportionate  prominence of the lateral ventricles relative to cortical volume  loss. Borderline Tanner's index. Normal callosal angle.      MRA:  1. No evidence of major vessel cut off, intracranial aneurysm,  pseudoaneurysm, or dissection.  2.  Suboptimal flow related signal within the distal carotid  arteries, proximal middle cerebral arteries, and mid basilar artery  likely secondary to motion related artifact.  3. Moderate motion artifact degraded cervical MRA.  4. Multifocal mild-to-moderate narrowing of the bilateral  intracranial internal carotid arteries and vertebral arteries, likely  sequelae of atherosclerosis.      MACRO:  Critical Finding:  See findings. Notification was initiated on  12/11/2023 at 11:44 pm by  Ryan Leahy.  (**-YCF-**)  Instructions:      Signed by: Ryan Leahy 12/11/2023 11:46 PM  Dictation workstation:   [id number]         Gray Top        Component    Extra Tube    Hold for add-ons.      Comment:    Auto resulted.                  XR chest 2 views          Interpreted By:  Ryan Leahy and Liller Gregory   STUDY:  XR CHEST 2 VIEWS;  12/11/2023 7:25 pm      INDICATION:  Signs/Symptoms:near syncope.      COMPARISON:  01/06/2022      ACCESSION NUMBER(S):  [id number]      ORDERING CLINICIAN:  EFRAIN GARCIA      FINDINGS:  PA and lateral radiographs of the chest were provided.      CARDIOMEDIASTINAL SILHOUETTE:  Cardiomediastinal silhouette is enlarged but stable in size and  configuration. Aortic arch calcifications are seen.      LUNGS:  Bibasilar streaky opacities are noted favored to represent  atelectasis. Slight diffuse interstitial prominence. Mild subpleural  reticular opacities which may reflect mild fibrotic change. No focal  consolidation, pleural effusion, or pneumothorax.      ABDOMEN:  No remarkable upper abdominal findings.      BONES:  No acute osseous injury. Slight height loss at T6 and T7 unchanged  compared to prior exam from 01/06/2022.  Remote right rib fractures.  Severe osteopenia.      IMPRESSION:  1. Slight diffuse interstitial prominence, likely interstitial edema.  2. Stable cardiomegaly.      MACRO:  none.      Signed by: Ryan Leahy 12/11/2023 8:15 PM  Dictation workstation:   [id number]         Hemoglobin A1c        Component Value Flag Ref Range Units Status    Hemoglobin A1C 5.2      see below % Final    Estimated Average Glucose 103      Not Established mg/dL Final                  Lipid Panel Non-Fasting        Component Value Flag Ref Range Units Status    Cholesterol 207      0 - 199 mg/dL Final    Comment:          Age      Desirable   Borderline High   High     0-19 Y     0 - 169       170 - 199     >/= 200    20-24 Y     0 - 189       190 - 224     >/= 225         >24 Y     0 - 199       200 - 239     >/= 240   **All ranges are based on fasting samples. Specific   therapeutic targets will vary based on patient-specific   cardiac risk.    Pediatric guidelines reference:Pediatrics 2011, 128(S5).Adult guidelines reference: NCEP ATPIII Guidelines,JOSELINE 2001, 258:2486-97    Venipuncture immediately after or during the administration of Metamizole may lead to falsely low results. Testing should be performed immediately prior to Metamizole dosing.    HDL-Cholesterol 48.9       mg/dL Final    Comment:      Age       Very Low   Low     Normal    High    0-19 Y    < 35      < 40     40-45     ----  20-24 Y    ----     < 40      >45      ----        >24 Y      ----     < 40     40-60      >60      Cholesterol/HDL Ratio 4.2        Final    Comment:      Ref Values  Desirable  < 3.4  High Risk  > 5.0    Non-HDL Cholesterol 158      0 - 149 mg/dL Final    Comment:       Age        Desiable   Borderline High   High     Very High   0-19 Y     0 - 119       120 - 144     >/= 145    >/= 160  20-24 Y     0 - 149       150 - 189     >/= 190      ----       >24 Y    30 MG/DL ABOVE LDL CHOLESTEROL GOAL                  Urinalysis with Reflex  Microscopic and Culture        Component Value Flag Ref Range Units Status    Color, Urine Yellow      Straw, Yellow  Final    Appearance, Urine Hazy   (Normal)   Clear  Final    Specific Gravity, Urine 1.016      1.005 - 1.035  Final    pH, Urine 5.0      5.0, 5.5, 6.0, 6.5, 7.0, 7.5, 8.0  Final    Protein, Urine >=500 (3+)   (Normal)   NEGATIVE mg/dL Final    Glucose, Urine NEGATIVE      NEGATIVE mg/dL Final    Blood, Urine NEGATIVE      NEGATIVE  Final    Ketones, Urine NEGATIVE      NEGATIVE mg/dL Final    Bilirubin, Urine NEGATIVE      NEGATIVE  Final    Urobilinogen, Urine <2.0      <2.0 mg/dL Final    Nitrite, Urine NEGATIVE      NEGATIVE  Final    Leukocyte Esterase, Urine SMALL (1+)      NEGATIVE  Final                  Extra Urine Gray Tube        Component    Extra Tube    Hold for add-ons.      Comment:    Auto resulted.                  Microscopic Only, Urine        Component Value Flag Ref Range Units Status    WBC, Urine 11-20      1-5, NONE /HPF Final    RBC, Urine 3-5      NONE, 1-2, 3-5 /HPF Final    Squamous Epithelial Cells, Urine 10-25 (FEW)      Reference range not established. /HPF Final                     Impression and Plan  In summary, Mrs. Camarillo is admitted to the [facility] Clinical [facility] Unit for TIA workup. Dr. Chávez is the CDU admission attending.    This patient has been risk-stratified based on available history, physical exam, and related study findings. Admission to the observation status for further diagnosis/treatment/monitoring of TIA is warranted clinically. This extended period of observation is specifically required to determine the need for hospitalization.     The goals of this admission based on the patient’s clinical problem list are:  1) monitor vitals and hemodynamic stability  2) MRI brain, MRA head and neck  3) transthoracic echocardiogram  4) TIA lab work  5) MRI brain with contrast    We will observe the patient for the following  endpoints:   1) imaging results  2) disposition as appropriate  3) neurosurgery final recommendations    When met, appropriate disposition will be arranged     Clarence Ocasio PA-C

## 2023-12-12 NOTE — CONSULTS
Inpatient consult to Neurosurgery  Consult performed by: Francisco Mckeon MD  Consult ordered by: Clarence Ocasio PA-C  Reason for consult: incidental extra-axial CPA mass          Reason For Consult  Incidental extra-axial CPA mass    History Of Present Illness  Nicolette Camarillo is a 89 y.o. 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       Daughter is at bedside and provides additional history.  She reports a very transient episode of abnormal jaw movement, unable to tell whether she had a loss of consciousness or fall.  Denies any weakness numbness changes in vision mental status.  Denies any blood thinner use.  All symptoms were resolved by the time they got to the emergency room.    Past Medical History  She has a past medical history of COVID-19 (01/28/2022).    Surgical History  She has a past surgical history that includes MR angio head wo IV contrast (12/11/2023) and MR angio neck wo IV contrast (12/11/2023).     Social History  She has no history on file for tobacco use, alcohol use, and drug use.    Family History  No family history on file.     Allergies  Penicillins and Sulfa (sulfonamide antibiotics)    Review of Systems   10 point ROS is obtained and negative except the ones mentioned in the HPI   Physical Exam  Constitutional:       Appearance: She is normal weight.   HENT:      Head: Normocephalic.      Right Ear: External ear normal.      Left Ear: External ear normal.      Nose: Nose normal.      Mouth/Throat:      Mouth: Mucous membranes are moist.   Eyes:      Extraocular Movements: Extraocular movements intact.      Pupils: Pupils are equal, round, and reactive to light.   Cardiovascular:      Rate and Rhythm: Normal rate and regular rhythm.   Pulmonary:      Effort: Pulmonary effort is normal.   Abdominal:       Palpations: Abdomen is soft.   Musculoskeletal:      Cervical back: Normal range of motion.   Neurological:      Comments: NAD, A&Ox3  Cranial Nerves II-XII: PERRL, EOMI, Face symmetric, Facial SILT, Palate/Tongue midline and symmetric, shoulder shrugs symmetric, hearing intact to finger rubs bilaterally  Motor: 5/5  Sensation: SILT throughout all extremities  DTRS: 2+ Throughout          Last Recorded Vitals  Blood pressure 172/90, pulse 86, temperature 37.5 °C (99.5 °F), resp. rate 22, height 1.524 m (5'), weight 65.8 kg (145 lb), SpO2 91 %.    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    Recs:  MRI finding of the CPA mass is likely incidental in nature.  We will arrange follow-up imaging in 12-month for stability.  Recommended neurology consultation for TIA versus seizure workup as the above findings on MRI cannot explain her transient abnormal jaw and neurologic findings    Patient is staffed with neurosurgery chief resident and attending,

## 2023-12-13 LAB — BACTERIA UR CULT: NORMAL

## 2025-02-03 ENCOUNTER — CLINICAL SUPPORT (OUTPATIENT)
Dept: EMERGENCY MEDICINE | Facility: HOSPITAL | Age: OVER 89
End: 2025-02-03
Payer: COMMERCIAL

## 2025-02-03 ENCOUNTER — HOSPITAL ENCOUNTER (EMERGENCY)
Facility: HOSPITAL | Age: OVER 89
Discharge: LEFT BEFORE DISCHARGE | End: 2025-02-03
Payer: COMMERCIAL

## 2025-02-03 VITALS
WEIGHT: 144 LBS | TEMPERATURE: 98.4 F | HEART RATE: 89 BPM | SYSTOLIC BLOOD PRESSURE: 167 MMHG | BODY MASS INDEX: 28.12 KG/M2 | DIASTOLIC BLOOD PRESSURE: 77 MMHG | RESPIRATION RATE: 16 BRPM | OXYGEN SATURATION: 93 %

## 2025-02-03 PROCEDURE — 93005 ELECTROCARDIOGRAM TRACING: CPT

## 2025-02-03 PROCEDURE — 99281 EMR DPT VST MAYX REQ PHY/QHP: CPT

## 2025-02-03 PROCEDURE — 4500999001 HC ED NO CHARGE

## 2025-02-03 ASSESSMENT — PAIN SCALES - GENERAL: PAINLEVEL_OUTOF10: 0 - NO PAIN

## 2025-02-03 ASSESSMENT — PAIN - FUNCTIONAL ASSESSMENT: PAIN_FUNCTIONAL_ASSESSMENT: 0-10

## 2025-02-03 NOTE — ED TRIAGE NOTES
Pt presents to the ED c/o SOB, cough, and wheezing x 3 days. Pt family states that they have been using her rescue inhaler and does endorse relief from it, but states that sx always return. Denies Pmhx of CHF or COPD. Pt denies CP, abd pain.

## 2025-02-06 LAB
ATRIAL RATE: 84 BPM
P AXIS: 57 DEGREES
P OFFSET: 204 MS
P ONSET: 156 MS
PR INTERVAL: 134 MS
Q ONSET: 223 MS
QRS COUNT: 14 BEATS
QRS DURATION: 56 MS
QT INTERVAL: 358 MS
QTC CALCULATION(BAZETT): 423 MS
QTC FREDERICIA: 400 MS
R AXIS: 63 DEGREES
T AXIS: 123 DEGREES
T OFFSET: 402 MS
VENTRICULAR RATE: 84 BPM